# Patient Record
Sex: MALE | Employment: FULL TIME | URBAN - METROPOLITAN AREA
[De-identification: names, ages, dates, MRNs, and addresses within clinical notes are randomized per-mention and may not be internally consistent; named-entity substitution may affect disease eponyms.]

---

## 2021-03-03 DIAGNOSIS — Z23 NEED FOR VACCINATION: ICD-10-CM

## 2022-07-01 ENCOUNTER — IMAGING SERVICES (OUTPATIENT)
Dept: GENERAL RADIOLOGY | Age: 42
End: 2022-07-01

## 2022-07-01 ENCOUNTER — OFFICE VISIT (OUTPATIENT)
Dept: ORTHOPEDICS | Age: 42
End: 2022-07-01

## 2022-07-01 DIAGNOSIS — M67.911 TENDINOPATHY OF RIGHT ROTATOR CUFF: Primary | ICD-10-CM

## 2022-07-01 DIAGNOSIS — M25.511 RIGHT SHOULDER PAIN, UNSPECIFIED CHRONICITY: ICD-10-CM

## 2022-07-01 PROCEDURE — 99203 OFFICE O/P NEW LOW 30 MIN: CPT | Performed by: ORTHOPAEDIC SURGERY

## 2022-07-01 PROCEDURE — 73030 X-RAY EXAM OF SHOULDER: CPT | Performed by: ORTHOPAEDIC SURGERY

## 2022-10-02 ENCOUNTER — IMMUNIZATION (OUTPATIENT)
Dept: URGENT CARE | Age: 42
End: 2022-10-02

## 2022-10-02 DIAGNOSIS — Z23 NEED FOR VACCINATION: Primary | ICD-10-CM

## 2022-10-02 PROCEDURE — 90471 IMMUNIZATION ADMIN: CPT | Performed by: NURSE PRACTITIONER

## 2022-10-02 PROCEDURE — 90686 IIV4 VACC NO PRSV 0.5 ML IM: CPT | Performed by: NURSE PRACTITIONER

## 2023-11-17 PROBLEM — M54.30 SCIATICA: Status: ACTIVE | Noted: 2023-11-17

## 2023-11-17 PROBLEM — E78.5 HYPERLIPIDEMIA: Status: ACTIVE | Noted: 2023-11-17

## 2023-11-17 PROBLEM — J06.9 ACUTE URI: Status: ACTIVE | Noted: 2023-11-17

## 2023-11-17 PROBLEM — M75.100 NONTRAUMATIC TEAR OF ROTATOR CUFF: Status: ACTIVE | Noted: 2023-11-17

## 2024-08-06 PROBLEM — Z00.00 ROUTINE GENERAL MEDICAL EXAMINATION AT A HEALTH CARE FACILITY: Status: ACTIVE | Noted: 2024-08-06

## 2025-01-09 ENCOUNTER — APPOINTMENT (OUTPATIENT)
Dept: GENERAL RADIOLOGY | Facility: HOSPITAL | Age: 45
End: 2025-01-09
Attending: EMERGENCY MEDICINE
Payer: COMMERCIAL

## 2025-01-09 ENCOUNTER — APPOINTMENT (OUTPATIENT)
Dept: INTERVENTIONAL RADIOLOGY/VASCULAR | Facility: HOSPITAL | Age: 45
End: 2025-01-09
Attending: NURSE PRACTITIONER
Payer: COMMERCIAL

## 2025-01-09 ENCOUNTER — HOSPITAL ENCOUNTER (INPATIENT)
Facility: HOSPITAL | Age: 45
LOS: 8 days | Discharge: HOME HEALTH | End: 2025-01-17
Attending: EMERGENCY MEDICINE | Admitting: INTERNAL MEDICINE
Payer: COMMERCIAL

## 2025-01-09 ENCOUNTER — APPOINTMENT (OUTPATIENT)
Dept: CT IMAGING | Facility: HOSPITAL | Age: 45
End: 2025-01-09
Attending: EMERGENCY MEDICINE
Payer: COMMERCIAL

## 2025-01-09 ENCOUNTER — APPOINTMENT (OUTPATIENT)
Dept: CV DIAGNOSTICS | Facility: HOSPITAL | Age: 45
End: 2025-01-09
Attending: INTERNAL MEDICINE
Payer: COMMERCIAL

## 2025-01-09 DIAGNOSIS — I21.4 NSTEMI (NON-ST ELEVATED MYOCARDIAL INFARCTION) (HCC): Primary | ICD-10-CM

## 2025-01-09 DIAGNOSIS — J90 PLEURAL EFFUSION: ICD-10-CM

## 2025-01-09 DIAGNOSIS — G89.18 POST-OPERATIVE PAIN: ICD-10-CM

## 2025-01-09 LAB
ALBUMIN SERPL-MCNC: 4.4 G/DL (ref 3.2–4.8)
ALBUMIN/GLOB SERPL: 1.4 {RATIO} (ref 1–2)
ALP LIVER SERPL-CCNC: 55 U/L
ALT SERPL-CCNC: 23 U/L
ANION GAP SERPL CALC-SCNC: 10 MMOL/L (ref 0–18)
ANION GAP SERPL CALC-SCNC: 9 MMOL/L (ref 0–18)
APTT PPP: 24.7 SECONDS (ref 23–36)
APTT PPP: 49.3 SECONDS (ref 23–36)
AST SERPL-CCNC: 18 U/L (ref ?–34)
ATRIAL RATE: 49 BPM
ATRIAL RATE: 50 BPM
ATRIAL RATE: 52 BPM
ATRIAL RATE: 55 BPM
BASOPHILS # BLD AUTO: 0.09 X10(3) UL (ref 0–0.2)
BASOPHILS NFR BLD AUTO: 1.1 %
BILIRUB SERPL-MCNC: 0.8 MG/DL (ref 0.3–1.2)
BUN BLD-MCNC: 13 MG/DL (ref 9–23)
BUN BLD-MCNC: 14 MG/DL (ref 9–23)
CALCIUM BLD-MCNC: 10.2 MG/DL (ref 8.7–10.4)
CALCIUM BLD-MCNC: 10.5 MG/DL (ref 8.7–10.4)
CHLORIDE SERPL-SCNC: 105 MMOL/L (ref 98–112)
CHLORIDE SERPL-SCNC: 105 MMOL/L (ref 98–112)
CHOLEST SERPL-MCNC: 169 MG/DL (ref ?–200)
CO2 SERPL-SCNC: 24 MMOL/L (ref 21–32)
CO2 SERPL-SCNC: 28 MMOL/L (ref 21–32)
CREAT BLD-MCNC: 0.89 MG/DL
CREAT BLD-MCNC: 1 MG/DL
EGFRCR SERPLBLD CKD-EPI 2021: 107 ML/MIN/1.73M2 (ref 60–?)
EGFRCR SERPLBLD CKD-EPI 2021: 94 ML/MIN/1.73M2 (ref 60–?)
EOSINOPHIL # BLD AUTO: 0.22 X10(3) UL (ref 0–0.7)
EOSINOPHIL NFR BLD AUTO: 2.8 %
ERYTHROCYTE [DISTWIDTH] IN BLOOD BY AUTOMATED COUNT: 13.1 %
ERYTHROCYTE [DISTWIDTH] IN BLOOD BY AUTOMATED COUNT: 13.2 %
GLOBULIN PLAS-MCNC: 3.2 G/DL (ref 2–3.5)
GLUCOSE BLD-MCNC: 115 MG/DL (ref 70–99)
GLUCOSE BLD-MCNC: 121 MG/DL (ref 70–99)
HCT VFR BLD AUTO: 43.7 %
HCT VFR BLD AUTO: 46 %
HDLC SERPL-MCNC: 40 MG/DL (ref 40–59)
HGB BLD-MCNC: 14.8 G/DL
HGB BLD-MCNC: 15.4 G/DL
IMM GRANULOCYTES # BLD AUTO: 0.02 X10(3) UL (ref 0–1)
IMM GRANULOCYTES NFR BLD: 0.3 %
INR BLD: 1.11 (ref 0.8–1.2)
LDLC SERPL CALC-MCNC: 108 MG/DL (ref ?–100)
LYMPHOCYTES # BLD AUTO: 2.89 X10(3) UL (ref 1–4)
LYMPHOCYTES NFR BLD AUTO: 36.3 %
MCH RBC QN AUTO: 28 PG (ref 26–34)
MCH RBC QN AUTO: 28.3 PG (ref 26–34)
MCHC RBC AUTO-ENTMCNC: 33.5 G/DL (ref 31–37)
MCHC RBC AUTO-ENTMCNC: 33.9 G/DL (ref 31–37)
MCV RBC AUTO: 82.6 FL
MCV RBC AUTO: 84.4 FL
MONOCYTES # BLD AUTO: 0.84 X10(3) UL (ref 0.1–1)
MONOCYTES NFR BLD AUTO: 10.6 %
MRSA DNA SPEC QL NAA+PROBE: NEGATIVE
NEUTROPHILS # BLD AUTO: 3.9 X10 (3) UL (ref 1.5–7.7)
NEUTROPHILS # BLD AUTO: 3.9 X10(3) UL (ref 1.5–7.7)
NEUTROPHILS NFR BLD AUTO: 48.9 %
NONHDLC SERPL-MCNC: 129 MG/DL (ref ?–130)
OSMOLALITY SERPL CALC.SUM OF ELEC: 289 MOSM/KG (ref 275–295)
OSMOLALITY SERPL CALC.SUM OF ELEC: 295 MOSM/KG (ref 275–295)
P AXIS: -16 DEGREES
P AXIS: -16 DEGREES
P AXIS: -19 DEGREES
P AXIS: 2 DEGREES
P-R INTERVAL: 164 MS
P-R INTERVAL: 172 MS
P-R INTERVAL: 182 MS
P-R INTERVAL: 186 MS
PLATELET # BLD AUTO: 200 10(3)UL (ref 150–450)
PLATELET # BLD AUTO: 217 10(3)UL (ref 150–450)
POTASSIUM SERPL-SCNC: 3.8 MMOL/L (ref 3.5–5.1)
POTASSIUM SERPL-SCNC: 4.2 MMOL/L (ref 3.5–5.1)
PROT SERPL-MCNC: 7.6 G/DL (ref 5.7–8.2)
PROTHROMBIN TIME: 14.4 SECONDS (ref 11.6–14.8)
Q-T INTERVAL: 450 MS
Q-T INTERVAL: 454 MS
Q-T INTERVAL: 458 MS
Q-T INTERVAL: 458 MS
QRS DURATION: 160 MS
QRS DURATION: 162 MS
QRS DURATION: 164 MS
QRS DURATION: 166 MS
QTC CALCULATION (BEZET): 410 MS
QTC CALCULATION (BEZET): 413 MS
QTC CALCULATION (BEZET): 422 MS
QTC CALCULATION (BEZET): 438 MS
R AXIS: 85 DEGREES
R AXIS: 90 DEGREES
R AXIS: 94 DEGREES
R AXIS: 96 DEGREES
RBC # BLD AUTO: 5.29 X10(6)UL
RBC # BLD AUTO: 5.45 X10(6)UL
SARS-COV-2 RNA RESP QL NAA+PROBE: NOT DETECTED
SODIUM SERPL-SCNC: 139 MMOL/L (ref 136–145)
SODIUM SERPL-SCNC: 142 MMOL/L (ref 136–145)
T AXIS: -4 DEGREES
T AXIS: 0 DEGREES
T AXIS: 25 DEGREES
T AXIS: 26 DEGREES
TRIGL SERPL-MCNC: 118 MG/DL (ref 30–149)
TROPONIN I SERPL HS-MCNC: 2217 NG/L
TROPONIN I SERPL HS-MCNC: 254 NG/L
TROPONIN I SERPL HS-MCNC: 83 NG/L
VENTRICULAR RATE: 49 BPM
VENTRICULAR RATE: 50 BPM
VENTRICULAR RATE: 52 BPM
VENTRICULAR RATE: 55 BPM
VLDLC SERPL CALC-MCNC: 20 MG/DL (ref 0–30)
WBC # BLD AUTO: 8 X10(3) UL (ref 4–11)
WBC # BLD AUTO: 9.6 X10(3) UL (ref 4–11)

## 2025-01-09 PROCEDURE — 93306 TTE W/DOPPLER COMPLETE: CPT | Performed by: INTERNAL MEDICINE

## 2025-01-09 PROCEDURE — B211YZZ FLUOROSCOPY OF MULTIPLE CORONARY ARTERIES USING OTHER CONTRAST: ICD-10-PCS | Performed by: INTERNAL MEDICINE

## 2025-01-09 PROCEDURE — 99223 1ST HOSP IP/OBS HIGH 75: CPT | Performed by: STUDENT IN AN ORGANIZED HEALTH CARE EDUCATION/TRAINING PROGRAM

## 2025-01-09 PROCEDURE — B215YZZ FLUOROSCOPY OF LEFT HEART USING OTHER CONTRAST: ICD-10-PCS | Performed by: INTERNAL MEDICINE

## 2025-01-09 PROCEDURE — 71045 X-RAY EXAM CHEST 1 VIEW: CPT | Performed by: EMERGENCY MEDICINE

## 2025-01-09 PROCEDURE — 71260 CT THORAX DX C+: CPT | Performed by: EMERGENCY MEDICINE

## 2025-01-09 PROCEDURE — 4A023N7 MEASUREMENT OF CARDIAC SAMPLING AND PRESSURE, LEFT HEART, PERCUTANEOUS APPROACH: ICD-10-PCS | Performed by: INTERNAL MEDICINE

## 2025-01-09 RX ORDER — PROCHLORPERAZINE EDISYLATE 5 MG/ML
5 INJECTION INTRAMUSCULAR; INTRAVENOUS EVERY 8 HOURS PRN
Status: DISCONTINUED | OUTPATIENT
Start: 2025-01-09 | End: 2025-01-13

## 2025-01-09 RX ORDER — HEPARIN SODIUM AND DEXTROSE 10000; 5 [USP'U]/100ML; G/100ML
INJECTION INTRAVENOUS CONTINUOUS
Status: DISCONTINUED | OUTPATIENT
Start: 2025-01-09 | End: 2025-01-13

## 2025-01-09 RX ORDER — NITROGLYCERIN 20 MG/100ML
INJECTION INTRAVENOUS
Status: COMPLETED
Start: 2025-01-09 | End: 2025-01-09

## 2025-01-09 RX ORDER — ACETAMINOPHEN 500 MG
500 TABLET ORAL EVERY 4 HOURS PRN
Status: DISCONTINUED | OUTPATIENT
Start: 2025-01-09 | End: 2025-01-13

## 2025-01-09 RX ORDER — POLYETHYLENE GLYCOL 3350 17 G/17G
17 POWDER, FOR SOLUTION ORAL DAILY PRN
Status: DISCONTINUED | OUTPATIENT
Start: 2025-01-09 | End: 2025-01-13

## 2025-01-09 RX ORDER — AMLODIPINE BESYLATE 5 MG/1
5 TABLET ORAL DAILY
COMMUNITY
Start: 2024-11-27 | End: 2025-01-17

## 2025-01-09 RX ORDER — NITROGLYCERIN 20 MG/100ML
INJECTION INTRAVENOUS CONTINUOUS
Status: DISCONTINUED | OUTPATIENT
Start: 2025-01-09 | End: 2025-01-13

## 2025-01-09 RX ORDER — HEPARIN SODIUM 5000 [USP'U]/ML
INJECTION, SOLUTION INTRAVENOUS; SUBCUTANEOUS
Status: COMPLETED
Start: 2025-01-09 | End: 2025-01-09

## 2025-01-09 RX ORDER — SENNOSIDES 8.6 MG
17.2 TABLET ORAL NIGHTLY PRN
Status: DISCONTINUED | OUTPATIENT
Start: 2025-01-09 | End: 2025-01-13

## 2025-01-09 RX ORDER — IOPAMIDOL 755 MG/ML
100 INJECTION, SOLUTION INTRAVASCULAR
Status: COMPLETED | OUTPATIENT
Start: 2025-01-09 | End: 2025-01-09

## 2025-01-09 RX ORDER — ATORVASTATIN CALCIUM 10 MG/1
10 TABLET, FILM COATED ORAL DAILY
COMMUNITY
Start: 2024-11-27 | End: 2025-01-17

## 2025-01-09 RX ORDER — MULTIVIT-MIN/IRON FUM/FOLIC AC 7.5 MG-4
1 TABLET ORAL DAILY
COMMUNITY

## 2025-01-09 RX ORDER — SODIUM CHLORIDE 9 MG/ML
INJECTION, SOLUTION INTRAVENOUS
Status: DISCONTINUED | OUTPATIENT
Start: 2025-01-10 | End: 2025-01-09 | Stop reason: HOSPADM

## 2025-01-09 RX ORDER — ASPIRIN 81 MG/1
81 TABLET, CHEWABLE ORAL DAILY
Status: DISCONTINUED | OUTPATIENT
Start: 2025-01-09 | End: 2025-01-13

## 2025-01-09 RX ORDER — ASPIRIN 81 MG/1
TABLET, CHEWABLE ORAL
Status: COMPLETED
Start: 2025-01-09 | End: 2025-01-09

## 2025-01-09 RX ORDER — MULTIVIT WITH MINERALS/LUTEIN
1000 TABLET ORAL DAILY
COMMUNITY

## 2025-01-09 RX ORDER — MIDAZOLAM HYDROCHLORIDE 1 MG/ML
INJECTION INTRAMUSCULAR; INTRAVENOUS
Status: COMPLETED
Start: 2025-01-09 | End: 2025-01-09

## 2025-01-09 RX ORDER — ECHINACEA PURPUREA EXTRACT 125 MG
1 TABLET ORAL
Status: DISCONTINUED | OUTPATIENT
Start: 2025-01-09 | End: 2025-01-17

## 2025-01-09 RX ORDER — ROSUVASTATIN CALCIUM 20 MG/1
20 TABLET, COATED ORAL NIGHTLY
Status: DISCONTINUED | OUTPATIENT
Start: 2025-01-09 | End: 2025-01-17

## 2025-01-09 RX ORDER — ONDANSETRON 2 MG/ML
4 INJECTION INTRAMUSCULAR; INTRAVENOUS EVERY 6 HOURS PRN
Status: DISCONTINUED | OUTPATIENT
Start: 2025-01-09 | End: 2025-01-13

## 2025-01-09 RX ORDER — SODIUM PHOSPHATE, DIBASIC AND SODIUM PHOSPHATE, MONOBASIC 7; 19 G/230ML; G/230ML
1 ENEMA RECTAL ONCE AS NEEDED
Status: DISCONTINUED | OUTPATIENT
Start: 2025-01-09 | End: 2025-01-13

## 2025-01-09 RX ORDER — BISACODYL 10 MG
10 SUPPOSITORY, RECTAL RECTAL
Status: DISCONTINUED | OUTPATIENT
Start: 2025-01-09 | End: 2025-01-13

## 2025-01-09 RX ORDER — HEPARIN SODIUM AND DEXTROSE 10000; 5 [USP'U]/100ML; G/100ML
12 INJECTION INTRAVENOUS ONCE
Status: COMPLETED | OUTPATIENT
Start: 2025-01-09 | End: 2025-01-09

## 2025-01-09 RX ORDER — SODIUM CHLORIDE 9 MG/ML
INJECTION, SOLUTION INTRAVENOUS CONTINUOUS
Status: DISCONTINUED | OUTPATIENT
Start: 2025-01-09 | End: 2025-01-13

## 2025-01-09 RX ORDER — VERAPAMIL HYDROCHLORIDE 2.5 MG/ML
INJECTION, SOLUTION INTRAVENOUS
Status: COMPLETED
Start: 2025-01-09 | End: 2025-01-09

## 2025-01-09 RX ORDER — BENZONATATE 100 MG/1
200 CAPSULE ORAL 3 TIMES DAILY PRN
Status: DISCONTINUED | OUTPATIENT
Start: 2025-01-09 | End: 2025-01-13

## 2025-01-09 RX ORDER — HEPARIN SODIUM 1000 [USP'U]/ML
5000 INJECTION, SOLUTION INTRAVENOUS; SUBCUTANEOUS ONCE
Status: COMPLETED | OUTPATIENT
Start: 2025-01-09 | End: 2025-01-09

## 2025-01-09 RX ORDER — LIDOCAINE HYDROCHLORIDE 10 MG/ML
INJECTION, SOLUTION EPIDURAL; INFILTRATION; INTRACAUDAL; PERINEURAL
Status: COMPLETED
Start: 2025-01-09 | End: 2025-01-09

## 2025-01-09 NOTE — ED PROVIDER NOTES
Patient Seen in: [facility] Emergency Department      History     Chief Complaint   Patient presents with    Chest Pain Angina     Stated Complaint:     Subjective:   HPI      This is a 44-year-old gentleman history of hypertension hyperlipidemia here for evaluation of chest discomfort.  States last night around 5 PM was in the garage tossing a basketball with his 10-year-old daughter, began to develop central chest pressure, states it felt like when his chest is exposed to cold air, just felt tight.  Symptoms persisted, seemed to improve a few hours later took some Tums thinking it might help in case it was reflux.  Then woke up with more significant chest discomfort more of a pressure, felt sweaty.  Symptoms persisted, decided to call EMS as they were not getting better.  Symptoms are constant nothing makes it better or worse.  Reports father with heart attack in his 40s.  Patient is otherwise had no recent change in his exercise tolerance no recent fevers vomiting difficulty breathing leg swelling any other complaints.  Did receive aspirin as well as sublingual nitroglycerin en route states that sublingual nitroglycerin did help with his symptoms.    Objective:     Past Medical History:    Essential hypertension    High blood pressure    High cholesterol    Hyperlipidemia              History reviewed. No pertinent surgical history.             Social History     Socioeconomic History    Marital status:    Tobacco Use    Smoking status: Never    Smokeless tobacco: Never   Substance and Sexual Activity    Alcohol use: Not Currently     Comment: socially    Drug use: Never     Social Drivers of Health     Financial Resource Strain: Low Risk  (7/31/2024)    Received from [facility] in [place]    Financial Resource Strain     In the past year, have you or any family members you live with been unable to get any of the following when it was really needed? Check all that apply.: None   Food Insecurity:  Low Risk  (7/31/2024)    Received from [facility]    Food Insecurity     Within the past 12 months, you worried that your food would run out before you got money to buy more.  : Never true     Within the past 12 months, the food you bought just didn't last and you didn't have money to get more. : Never true   Transportation Needs: Not At Risk (7/31/2024)    Received from [facility]    Transportation Needs     In the past 12 months, has lack of reliable transportation kept you from medical appointments, meetings, work or from getting things needed for daily living? : No   Physical Activity: Unknown (7/31/2024)    Received from [facility]    Exercise Vital Sign     On average, how many minutes do you engage in exercise at this level?: 40 min   Stress: Low Risk  (7/31/2024)    Received from [facility]    Stress     Stress is when someone feels tense, nervous, anxious, or can't sleep at night because their mind is troubled. How stressed are you? : Not at all   Social Connections: Low Risk  (11/17/2023)    Received from [facility]    Social Connections     How often do you see or talk to people that you care about and feel close to? (For example: talking to friends on the phone, visiting friends or family, going to S[employer] or club meetings): 5 or more times a week                  Physical Exam     ED Triage Vitals   BP 01/09/25 0506 (!) 185/101   Pulse 01/09/25 0506 51   Resp 01/09/25 0506 14   Temp 01/09/25 0515 98.4 °F (36.9 °C)   Temp src 01/09/25 0515 Oral   SpO2 01/09/25 0506 100 %   O2 Device 01/09/25 0506 None (Room air)       Current Vitals:   Vital Signs  BP: 143/88  Pulse: 67  Resp: 15  Temp: 97.3 °F (36.3 °C)  Temp src: Temporal  MAP (mmHg): (!) 102    Oxygen Therapy  SpO2: 98 %  O2 Device: None (Room air)        Physical Exam      Physical Exam  Vitals signs and nursing note reviewed.   General:  Patient laying supine in the bed in no acute  distress  Head: Normocephalic and atraumatic.   HEENT:  Mucous membranes are moist.   Cardiovascular:  Normal rate and regular rhythm.  No Edema, bilateral radial pulses 2+.  Pulmonary:  Pulmonary effort is normal.  Normal breath sounds. No wheezing, rhonchi or rales.   Abdominal: Soft nontender nondistended, normal bowel sounds, no guarding no rebound tenderness  Skin: Warm and dry  Neurological: Awake alert, speech is normal        ED Course     Labs Reviewed   COMP METABOLIC PANEL (14) - Abnormal; Notable for the following components:       Result Value    Glucose 115 (*)     Calcium, Total 10.5 (*)     All other components within normal limits   TROPONIN I HIGH SENSITIVITY - Abnormal; Notable for the following components:    Troponin I (High Sensitivity) 83 (*)     All other components within normal limits   LIPID PANEL - Abnormal; Notable for the following components:    LDL Cholesterol 108 (*)     All other components within normal limits   TROPONIN I HIGH SENSITIVITY - Abnormal; Notable for the following components:    Troponin I (High Sensitivity) 254 (*)     All other components within normal limits   TROPONIN I HIGH SENSITIVITY - Abnormal; Notable for the following components:    Troponin I (High Sensitivity) 2,217 (*)     All other components within normal limits   BASIC METABOLIC PANEL (8) - Abnormal; Notable for the following components:    Glucose 121 (*)     All other components within normal limits   PROTHROMBIN TIME (PT) - Normal   PTT, ACTIVATED - Normal   RAPID SARS-COV-2 BY PCR - Normal   ED/MRSA SCREEN BY PCR-CC - Normal   CBC WITH DIFFERENTIAL WITH PLATELET   CBC, PLATELET; NO DIFFERENTIAL   PTT, ACTIVATED   RAINBOW DRAW LAVENDER   RAINBOW DRAW LIGHT GREEN   RAINBOW DRAW BLUE   RAINBOW DRAW GOLD     EKG  Rate, intervals and axes as noted on EKG Report.  Rate: 55  Rhythm: Sinus Rhythm  Reading: Isolated T wave inversions in V3, no STEMI      EKG    Rate, intervals and axes as noted on EKG  Report.  Rate: 52  Rhythm: Sinus Rhythm  Reading: Isolated T wave inversion in V3, no STEMI no change from prior    EKG  Rate, intervals and axes as noted on EKG Report.  Rate: 49  Rhythm: Sinus Rhythm  Reading: Isolated T wave inversion in V3 no STEMI, some T wave inversion developing in lead III    EKG    Rate, intervals and axes as noted on EKG Report.  Rate: 50  Rhythm: Sinus Rhythm  Reading: T wave inversion involving now in V3 V4 half box ST elevation in V2, no STEMI                CATH ANGIO    Result Date: 1/9/2025  This exam has been completed. Please refer to Notes for the results to this procedure.    XR CHEST AP PORTABLE  (CPT=71045)    Result Date: 1/9/2025  PROCEDURE:  XR CHEST AP PORTABLE  (CPT=71045)  TECHNIQUE:  AP chest radiograph was obtained.  COMPARISON:  None.  INDICATIONS:  cp  PATIENT STATED HISTORY: (As transcribed by Technologist)  Pt. with chest pain.    FINDINGS:  No lobar consolidation.  Cardiac silhouette is within normal limits for AP technique.  No significant pleural fluid or pneumothorax.            CONCLUSION:  No acute cardiopulmonary process.  A preliminary report was provided by the [facility] teleradiology service.    LOCATION:  [facility]      Dictated by (CST): Stromberg, LeRoy, MD on 1/09/2025 at 8:28 AM     Finalized by (CST): Stromberg, LeRoy, MD on 1/09/2025 at 8:29 AM       CT CHEST PE AORTA (IV ONLY) (CPT=71260)    Result Date: 1/9/2025  PROCEDURE:  CT CHEST PE AORTA (IV ONLY) (CPT=71260)  COMPARISON:  None.  INDICATIONS:  chest pain  TECHNIQUE:  CT images were obtained with non-ionic intravenous contrast material. Dose reduction techniques were used. Dose information is transmitted to the ACR (American College of Radiology) NRDR (National Radiology Data Registry) which includes the Dose Index Registry.  PATIENT STATED HISTORY:(As transcribed by Technologist)  Chest pain   CONTRAST USED:  100cc of Isovue 370  FINDINGS:  LUNGS:  No visible pulmonary disease.  VASCULATURE:  No  visible pulmonary arterial thrombus or attenuation.  NIYA:  No mass or adenopathy.  MEDIASTINUM:  No mass or adenopathy.  Mild concentric thickening of the distal esophagus. CARDIAC:  No enlargement, pericardial thickening, or significant coronary artery calcification. PLEURA:  No mass or effusion.  THORACIC AORTA:  No thoracic aortic aneurysm or dissection.  Note made of moderate vessel narrowing of the proximal left subclavian artery as it passes between the clavicle and left 1st rib (series 4, image 42). CHEST WALL:  No mass or axillary adenopathy.  Prominent network of vascular collaterals noted about the right shoulder girdle.  Note made of marked narrowing of the right subclavian vein as it passes between the clavicle and 1st rib (series 4, image 49).   LIMITED ABDOMEN:  Limited images of the upper abdomen are unremarkable.  BONES:  No bony lesion or fracture.             CONCLUSION:   1. Negative for pulmonary embolism or other acute cardiopulmonary process.  2. Mild concentric thickening of the mid/distal esophagus may reflect esophagitis, best assessed with endoscopy.  3. Prominent network of vascular collaterals noted about the right shoulder girdle.  This is secondary to marked narrowing of the right subclavian vein as it passes between the clavicle and 1st rib, imaging features suggesting a component of thoracic outlet syndrome of venous origin.  In addition, there is moderate narrowing of the proximal left subclavian artery as it passes between the left clavicle and 1st rib which may contribute to thoracic outlet syndrome of arterial origin.    LOCATION:  [facility]   Dictated by (CST): Jad Mendez MD on 1/09/2025 at 8:02 AM     Finalized by (CST): Jad Mendez MD on 1/09/2025 at 8:07 AM             MDM      This is a 44-year-old gentleman here for evaluation of chest pain.  Differential includes ACS, pneumothorax, aortic dissection, GERD, hypertensive emergency.  Initial EKG with isolated T wave inversion  in V3.  Initial troponin elevated above normal at 83.  Patient reports improvement in his chest discomfort from about a 5 on arrival to 2 on nitroglycerin drip.  Heparin drip started.  Case discussed with [facility] patient will be admitted to the CCU blood pressure improved to 130 systolic on nitroglycerin drip.    Case endorsed to [place] hospitalist Dr. Quevedo who agrees with plan for admission    0650 patient reports 2 out of 10 chest discomfort does have some evolving EKG changes with T wave inversions in multiple leads half box of ST elevation in V2.  Discussed these changes with cardiology KIKI Larson, repeat troponin requested.  CT of the chest requested shows no acute abnormalities      I independently viewed and interpreted the following imaging: Chest x-ray does not show widened mediastinum  Admission disposition: 1/9/2025  5:44 AM           Total critical care time: Approximately 70 minutes  Due to a high probability of clinically significant, life threatening deterioration, the patient required my highest level of preparedness to intervene emergently and I personally spent this critical care time directly and personally managing the patient. This critical care time included obtaining a history; examining the patient; pulse oximetry; ordering and review of studies; arranging urgent treatment with development of a management plan; evaluation of patient's response to treatment; frequent reassessment; and, discussions with other providers.  This critical care time was performed to assess and manage the high probability of imminent, life-threatening deterioration that could result in multi-organ failure. It was exclusive of separately billable procedures and treating other patients and teaching time.      Medical Decision Making      Disposition and Plan     Clinical Impression:  1. NSTEMI (non-ST elevated myocardial infarction) ([facility])         Disposition:  Admit  1/9/2025  5:44 am    Follow-up:  No follow-up  provider specified.        Medications Prescribed:  Current Discharge Medication List              Supplementary Documentation:         Hospital Problems       Present on Admission             ICD-10-CM Noted POA    * (Principal) NSTEMI (non-ST elevated myocardial infarction) (HCC) I21.4 1/9/2025 Unknown

## 2025-01-09 NOTE — H&P
[facility]     Wander Castro Patient Status:  Emergency    1980 MRN [id number]   [facility] EMERGENCY DEPARTMENT Attending Nikolay Garcia MD   Hosp Day # 0 PCP No primary care provider on file.     Chief Complaint: chest pain    Subjective:    History of Present Illness:     Wander Castro is a 44 year old male with PMHx HTN/HLD who presented to the hospital for chest pain. His pain started yesterday when he as helping train his daughter in the garage for basketball. It was a pressure sensation in his substernal region rated 2-6/10 which was decreased with nitroglycerin and had no exacerbating factors. He notes feeling winded and lightheaded and went and sat down before it resolved. He then woke up at 4 AM with the pain again and decided to seek medical eval. He denied any prior stress test but notes a family history of an MI in his father in his 40's.    History/Other:    Past Medical History:  Past Medical History:    Essential hypertension    Hyperlipidemia     Past Surgical History:   History reviewed. No pertinent surgical history.   Family History:   No family history on file.  Social History:    reports that he has never smoked. He has never used smokeless tobacco. He reports that he does not currently use alcohol. He reports that he does not use drugs.     Allergies: Allergies[1]    Medications:  Medications Ordered Prior to Encounter[2]    Review of Systems:   A comprehensive review of systems was completed.    Pertinent positives and negatives noted in the HPI.    Objective:   Physical Exam:    /85   Pulse (!) 48   Temp 98.4 °F (36.9 °C) (Oral)   Resp 10   Ht 5' 10\" (1.778 m)   Wt 190 lb (86.2 kg)   SpO2 100%   BMI 27.26 kg/m²   General: No acute distress, Alert  Respiratory: No rhonchi, no wheezes  Cardiovascular: S1, S2. Regular rate and rhythm  Abdomen: Soft, Non-tender, non-distended, positive bowel sounds  Neuro: No new focal  deficits  Extremities: No edema    Results:    Labs:      Labs Last 24 Hours:    Recent Labs   Lab 01/09/25  0509   RBC 5.45   HGB 15.4   HCT 46.0   MCV 84.4   MCH 28.3   MCHC 33.5   RDW 13.2   NEPRELIM 3.90   WBC 8.0   .0       Recent Labs   Lab 01/09/25  0509   *   BUN 14   CREATSERUM 1.00   EGFRCR 94   CA 10.5*   ALB 4.4      K 3.8      CO2 28.0   ALKPHO 55   AST 18   ALT 23   BILT 0.8   TP 7.6       Estimated Glomerular Filtration Rate: 94 mL/min/1.73m2 (by CKD-EPI based on SCr of 1 mg/dL).    Lab Results   Component Value Date    INR 1.11 01/09/2025       Recent Labs   Lab 01/09/25  0509   TROPHS 83*       No results for input(s): \"TROP\", \"PBNP\" in the last 168 hours.    No results for input(s): \"PCT\" in the last 168 hours.    Imaging: Imaging data reviewed in Epic.    Assessment & Plan:      #NSTEMI  -troponin 83  -lipid panel significant for   -EKG showing sinus bradycardia @49 bpm, RBBB  -chest xray without acute process  -cont to trend troponin  -ASA  -pain controled on nitroglycerin gtt with close monitoring of blood pressure  -monitor on telemetry  -on heparin gtt with close monitoring of ptt and titration prn  -cardiology c/s in ED    #HTN  -cont home meds    #HLD  -cont home statin        Plan of care discussed with ED physician    Amy Quevedo DO    Supplementary Documentation:     The 21st Century Cures Act makes medical notes like these available to patients in the interest of transparency. Please be advised this is a medical document. Medical documents are intended to carry relevant information, facts as evident, and the clinical opinion of the practitioner. The medical note is intended as peer to peer communication and may appear blunt or direct. It is written in medical language and may contain abbreviations or verbiage that are unfamiliar.                                       [1] No Known Allergies  [2]   No current facility-administered medications on file  prior to encounter.     Current Outpatient Medications on File Prior to Encounter   Medication Sig Dispense Refill    ATORVASTATIN CALCIUM OR Take by mouth.      UNKNOWN TO PATIENT - BLOOD PRESSURE

## 2025-01-09 NOTE — PLAN OF CARE
Patient seen and examined by Dr. Ronal Cooper, full consult to follow. CTA noted left subclavian artery stenosis. BP right arm- 128/88. BP left arm- 143/88.     Kary Barros PA-C   Cardiothoracic Surgery   1/9/2025  1:39 PM

## 2025-01-09 NOTE — CONSULTS
[facility]  [facility]  Report of Consultation    Wander Castro Patient Status:  Emergency    1980 MRN [id number]   Location [facility] EMERGENCY DEPARTMENT Attending Nikolay Garcia MD   Hosp Day # 0 PCP No primary care provider on file.     IP Consult to Cardiology  Consult performed by: Kary Larson APRN  Consult ordered by: Nikolay Garcia MD          Reason for Consultation:  AMI    History of Present Illness:  Wander Castro is a a(n) 44 year old male with HTN, dyslipidemia presents with chest pain.  Typically execises without issues. Last night while playing light basketball had a \"cold feeling\" in his chest, with associated fatigue.  Dissipated after a nap. This am woke up with crushing 10/10 central chest pain at 0400. Associated diaphoresis and dizziness. Called EMS.  Pain persistent but now somewhat improved on nitroglycerin gtt, 2/10. Denies syncope. History of vasovagal syncope with sight of blood years ago.     History:  Past Medical History:    Essential hypertension    Hyperlipidemia     History reviewed. No pertinent surgical history.  No family history on file.   reports that he has never smoked. He has never used smokeless tobacco. He reports that he does not currently use alcohol. He reports that he does not use drugs.    Allergies:  Allergies[1]    Medications:    Current Facility-Administered Medications:     nitroGLYCERIN in dextrose 5% 50 mg/250mL infusion premix, 5-400 mcg/min, Intravenous, Continuous    heparin (Porcine) 63462 units/250mL infusion ED INITIAL DOSE, 12 Units/kg/hr, Intravenous, Once    heparin (Porcine) 22273 units/250mL infusion ACS/AFIB CONTINUOUS, 200-3,000 Units/hr, Intravenous, Continuous    Review of Systems:    Respiratory:  Positive for chest tightness. Negative for shortness of breath.    Cardiovascular:  Positive for chest pain. Negative for palpitations and leg swelling.   Genitourinary:  Negative for flank pain.    Musculoskeletal:  Negative for back pain.   Neurological:  Positive for dizziness and light-headedness. Negative for syncope.   All other systems reviewed and are negative.        OBJECTIVE  Blood pressure 141/84, pulse 52, temperature 98.4 °F (36.9 °C), temperature source Oral, resp. rate 13, height 5' 10\" (1.778 m), weight 190 lb (86.2 kg), SpO2 100%.  Temp (24hrs), Av.4 °F (36.9 °C), Min:98.4 °F (36.9 °C), Max:98.4 °F (36.9 °C)    Wt Readings from Last 3 Encounters:   25 190 lb (86.2 kg)       Telemetry: NSR., SB with RBBB      Physical Exam:  Physical Exam  Neck:      Comments: No jvd   Cardiovascular:      Rate and Rhythm: Normal rate and regular rhythm.   Pulmonary:      Effort: Pulmonary effort is normal.      Breath sounds: No rales.   Abdominal:      Palpations: Abdomen is soft.   Musculoskeletal:      Right lower leg: No edema.      Left lower leg: No edema.   Skin:     General: Skin is warm and dry.   Neurological:      Mental Status: He is alert and oriented to person, place, and time.              Diagnostics History:            Results:   Recent Labs   Lab 25  0509   *   BUN 14   CREATSERUM 1.00   EGFRCR 94   CA 10.5*      K 3.8      CO2 28.0     Recent Labs   Lab 25  0509   RBC 5.45   HGB 15.4   HCT 46.0   MCV 84.4   MCH 28.3   MCHC 33.5   RDW 13.2   NEPRELIM 3.90   WBC 8.0   .0         [unfilled]  No results for input(s): \"BNP\" in the last 168 hours.  Lab Results   Component Value Date    INR 1.11 2025     No results found for: \"TROP\"      No results found.       Assessment   Chest pain /NSTEMI- heparin,nitroglycerin gtt   RBBB  Sinus bradycardia   HTN  HL      Plan:   -rule out aortic dissection , if negative will proceed with angiogram.     Risks, benefits, and alternatives of cardiac cath/PCI discussed in detail.  Risks include but not limited to death, MI, CVA, emergency CABG, transfusion, and surgical repair of vascular complications.  Patient  agreeable to procced.       Kary Larson, APRN  1/9/2025  7:24 AM      I have personally seen and examined patient.   I have independently formulated assessment and plan  I agree with the AP note    Patient presented with chest pain.  Yesterday, he had off-and-on chest pain.  Was noticed chest pain when he was salting his driveway.  Complained to his wife.  States pain resolved.  However continued to have stuttering pain all day yesterday.  Wife recommended going to the emergency room yesterday but patient declined.  This morning he woke up with severe sharp chest pain.  Associated diaphoresis and clamminess.  Called his wife and came to the emergency room.  Pain was nonpositional.  He was started on nitro drip.  This improved his pain.  Currently pain is resolved.  He is on a nitro drip.  Family history: Significant for MI in father in late 40s  EKG shows sinus rhythm with right bundle branch block and nonspecific changes.  Initial troponin was 80.  Second troponin was rising to 250.  Physical exam:  Alert and oriented x 3, no murmurs  Regular rate rhythm, no edema    Assessment and plan:  Non-STEMI type I  Strong family history of coronary artery disease      Patient presents with chest pain.  Chest pain currently relieved on nitro drip.  Presentation concerning for non-STEMI.  Will plan for cardiac catheterization.  Indication, risk, benefit and alternative were discussed in detail with patient and his wife.  They are in agreement.  Further recommendation after cardiac catheterization.    Patient was consented for left heart cardiac catheterization. The indications,risks, benefits and alternatives of the procedure were explained to the patient. The  risk of coronary angiography, possible angioplasty, include, but are not limited to stroke, death, heart attack, coronary dissection requiring emergent CABG, hematoma, bleeding, allergic reaction to medications, vascular damage requiring surgical repair,  kidney failure requiring dialysis and others. Patient wishes to proceed.    Thank you for the opportunity to participate in the care of your patient.     Sanchez Juarez MD  Interventional Cardiologist  [facility]           [1] No Known Allergies

## 2025-01-09 NOTE — PROCEDURES
[facility]   part of [facility]    Cardiac Cath Procedure Note  Wander Castro Patient Status:  Inpatient    1980 MRN [id number]   Location [facility] 6NE-A Attending Leslie Hernandes,    Hosp Day # 0 PCP No primary care provider on file.       Cardiologist: Sanchez Juarez MD  Primary Proceduralist: Sanchez Juarez MD  Procedure Performed: [facility]  Date of Procedure: 2025   Indication: NSTEMI    Consent:   Informed written consent was obtained from the patient after risk benefits and alternative explained the patient.  Patient agreed to proceed    Sedation  IV conscious sedation was achieved with Versed and fentanyl.  It was administered under my direct supervision.  Hemodynamic monitoring took place throughout the entire procedure by myself in the Cath Lab staff.  2 mg of Versed and 50mcg of Fentanyl were given.       Description of the procedure: After written informed consent was obtained from the patient, patient was brought to the cardiac catheterization laboratory in a stable condition and fasting state.  Patient was prepped and draped in the usual sterile fashion.  IV conscious sedation was achieved with Versed and fentanyl.  Lidocaine 1% was used to infiltrate the right radial artery for local anesthesia and a 6 Welsh introducer sheath was inserted into the right radial artery.      Specimen sent to: No specimen collected  Estimated blood loss: 10 cc  Closure:  TR band    Left Ventriculography and hemodynamics:   LV EDP 12 mmHg  No gradient across aortic valve        Coronary Angiography  RCA:  Large sized. Dominant. Mild CAD.   Left main:  Large, no significant obstructive CAD  Left anterior descending:  Large sized vessel. Has a 70% mid LAD stenosis. Ostium of diagonal appears to have a focal 70% stenosis.   Circumflex:  Anamolous arising from the ostium of RCA. There is a 99% stenosis pf the second marginal. This is culprit for patient presentation of NSTEMI. JAY 3 flow.        Assessment:    Multivessel CAD involving 70% mid LAD and 99% stenosis of OM2 with anamolous circumflex arising from the RCA       Recommendations:    Recommend cardiothoracic surgery evaluation for possible CABG  Routine post cath care  Resume heparin gtt  Echo  Discussed with patient          Sanchez Juarez MD  01/09/25

## 2025-01-09 NOTE — PAYOR COMM NOTE
--------------  ADMISSION REVIEW     Payor: [facility] (NON CONTRACTED IPA)  Subscriber #:  [id number]  Authorization Number: [id number]    Admit date: 1/9/25  Admit time:  8:05 AM       REVIEW DOCUMENTATION:  ED Provider Notes    No notes of this type exist for this encounter.         MEDICATIONS ADMINISTERED IN LAST 1 DAY:  heparin (Porcine) 1000 UNIT/ML injection - BOLUS IV 5,000 Units       Date Action Dose Route User    1/9/2025 0604 Given 5,000 Units Intravenous Tayler Vargas RN          heparin (Porcine) 70741 units/250mL infusion ACS/AFIB CONTINUOUS       Date Action Dose Route User    1/9/2025 1105 New Bag 1,000 Units/hr Intravenous Saliu, Ardita          heparin (Porcine) 25284 units/250mL infusion ED INITIAL DOSE       Date Action Dose Route User    1/9/2025 0606 New Bag 12 Units/kg/hr × 86.2 kg Intravenous Tayler Vargas RN          iopamidol (ISOVUE-370) 76 % injection 100 mL       Date Action Dose Route User    1/9/2025 1052 Given 150 mL Injection Shakeel Carlisle          iopamidol 76% (ISOVUE-370) injection for power injector       Date Action Dose Route User    1/9/2025 0740 Given 100 mL Intravenous Spies, Ana Cristina          nitroGLYCERIN in dextrose 5% 50 mg/250mL infusion premix       Date Action Dose Route User    1/9/2025 0655 Rate/Dose Change 50 mcg/min Intravenous Tayler Vargas RN    1/9/2025 0650 Rate/Dose Change 35 mcg/min Intravenous Tayler Vargas RN    1/9/2025 0615 Rate/Dose Change 25 mcg/min Intravenous Tayler Vargas RN    1/9/2025 0600 Rate/Dose Change 20 mcg/min Intravenous Tayler Vargas RN    1/9/2025 0555 Rate/Dose Change 15 mcg/min Intravenous Tayler Vargas RN    1/9/2025 0550 Rate/Dose Change 10 mcg/min Intravenous Tayler Vargas RN    1/9/2025 0530 New Bag 5 mcg/min Intravenous Tayler Vargas RN          sodium chloride 0.9% infusion       Date Action Dose Route User    1/9/2025 7199  New Bag (none) Intravenous Saliu, Ardita            Vitals (last day)       Date/Time Temp Pulse Resp BP SpO2 Weight O2 Device O2 Flow Rate (L/min) [facility]    01/09/25 1130 -- 65 11 129/84 95 % -- -- -- AS    01/09/25 1115 -- -- -- 125/81 95 % -- None (Room air) -- AS    01/09/25 0900 -- 65 9 137/81 95 % -- -- -- AS    01/09/25 0800 97.9 °F (36.6 °C) 56 15 123/77 100 % -- None (Room air) -- AS    01/09/25 0740 -- 53 15 135/81 100 % -- None (Room air) -- AL    01/09/25 0735 -- 60 12 119/74 100 % -- -- -- AL    01/09/25 0730 -- 63 14 121/75 100 % -- -- -- AL    01/09/25 0725 -- 57 11 140/83 100 % -- -- -- AL    01/09/25 0720 -- 53 15 137/83 100 % -- -- -- AL    01/09/25 0715 -- 68 9 144/89 100 % -- -- -- AL    01/09/25 0710 -- 52 13 134/84 100 % -- -- -- AL    01/09/25 0705 -- 56 17 137/87 100 % -- None (Room air) -- AL    01/09/25 0700 -- 52 13 141/84 100 % -- None (Room air) --     01/09/25 0655 -- 52 10 148/87 100 % -- None (Room air) --     01/09/25 0650 -- 51 14 150/88 100 % -- None (Room air) --     01/09/25 0645 -- 52 15 164/93 100 % -- None (Room air) --     01/09/25 0630 -- 49 12 152/88 100 % -- None (Room air) --     01/09/25 0615 -- 50 15 150/85 100 % -- None (Room air) --     01/09/25 0610 -- 51 20 148/86 100 % -- None (Room air) --     01/09/25 0605 -- 48 20 136/87 100 % -- None (Room air) --     01/09/25 0600 -- 49 15 143/84 100 % -- None (Room air) --     01/09/25 0555 -- 48 10 151/85 100 % -- None (Room air) --     01/09/25 0550 -- 52 10 150/82 100 % -- None (Room air) --     01/09/25 0545 -- 49 15 138/85 100 % -- None (Room air) --     01/09/25 0535 -- 49 12 136/84 100 % -- None (Room air) --     01/09/25 0530 -- 47 11 179/89 100 % -- None (Room air) --     01/09/25 0515 98.4 °F (36.9 °C) 50 11 192/104 100 % -- None (Room air) --     01/09/25 0506 -- 51 14 185/101 100 % 190 lb (86.2 kg) None (Room air) -- MM       1/9/2025 &P  [facility]             Wander Castro Patient Status:  Emergency    1980 MRN [id number]   Location [facility] EMERGENCY DEPARTMENT Attending Nikolay Garcia MD   Hosp Day # 0 PCP No primary care provider on file.      Chief Complaint: chest pain        Subjective:  History of Present Illness:      Wander Castro is a 44 year old male with PMHx HTN/HLD who presented to the hospital for chest pain. His pain started yesterday when he as helping train his daughter in the garage for basketball. It was a pressure sensation in his substernal region rated 2-6/10 which was decreased with nitroglycerin and had no exacerbating factors. He notes feeling winded and lightheaded and went and sat down before it resolved. He then woke up at 4 AM with the pain again and decided to seek medical eval. He denied any prior stress test but notes a family history of an MI in his father in his 40's.        History/Other:  Past Medical History:  Past Medical History       Past Medical History:    Essential hypertension    Hyperlipidemia        Past Surgical History:   Past Surgical History   History reviewed. No pertinent surgical history.      Family History:   Family History   No family history on file.     Social History:    reports that he has never smoked. He has never used smokeless tobacco. He reports that he does not currently use alcohol. He reports that he does not use drugs.      Allergies: [Allergies]    [Allergies]  No Known Allergies     Medications:  [Medications Ordered Prior to Encounter]    [Medications Ordered Prior to Encounter]  No current facility-administered medications on file prior to encounter.             Current Outpatient Medications on File Prior to Encounter   Medication Sig Dispense Refill    ATORVASTATIN CALCIUM OR Take by mouth.        UNKNOWN TO PATIENT - BLOOD PRESSURE              Review of Systems:   A comprehensive review of systems was completed.    Pertinent positives and negatives noted in the HPI.         Objective:  Physical Exam:    /85   Pulse (!) 48   Temp 98.4 °F (36.9 °C) (Oral)   Resp 10   Ht 5' 10\" (1.778 m)   Wt 190 lb (86.2 kg)   SpO2 100%   BMI 27.26 kg/m²   General: No acute distress, Alert  Respiratory: No rhonchi, no wheezes  Cardiovascular: S1, S2. Regular rate and rhythm  Abdomen: Soft, Non-tender, non-distended, positive bowel sounds  Neuro: No new focal deficits  Extremities: No edema        Results:  Labs:        Labs Last 24 Hours:         Recent Labs   Lab 01/09/25  0509   RBC 5.45   HGB 15.4   HCT 46.0   MCV 84.4   MCH 28.3   MCHC 33.5   RDW 13.2   NEPRELIM 3.90   WBC 8.0   .0             Recent Labs   Lab 01/09/25  0509   *   BUN 14   CREATSERUM 1.00   EGFRCR 94   CA 10.5*   ALB 4.4      K 3.8      CO2 28.0   ALKPHO 55   AST 18   ALT 23   BILT 0.8   TP 7.6         Estimated Glomerular Filtration Rate: 94 mL/min/1.73m2 (by CKD-EPI based on SCr of 1 mg/dL).           Lab Results   Component Value Date     INR 1.11 01/09/2025             Recent Labs   Lab 01/09/25  0509   TROPHS 83*         No results for input(s): \"TROP\", \"PBNP\" in the last 168 hours.     No results for input(s): \"PCT\" in the last 168 hours.     Imaging: Imaging data reviewed in Epic.        Assessment & Plan:  #NSTEMI  -troponin 83  -lipid panel significant for   -EKG showing sinus bradycardia @49 bpm, RBBB  -chest xray without acute process  -cont to trend troponin  -ASA  -pain controled on nitroglycerin gtt with close monitoring of blood pressure  -monitor on telemetry  -on heparin gtt with close monitoring of ptt and titration prn  -cardiology c/s in ED     #HTN  -cont home meds     #HLD  -cont home statin           Plan of care discussed with ED physician     Amy Quevedo DO       1/9/2025 Cardiology Procedure   Pre-procedure Diagnoses   NSTEMI (non-ST elevated myocardial infarction) (HCC) [I21.4]     Post-procedure Diagnoses   NSTEMI (non-ST elevated myocardial infarction)  ([facility]) [I21.4]     Procedures   CATH LEFT HEART CATHETERIZATION [[id number]]          Signed         [facility]     Cardiac Cath Procedure Note        Wander Castro Patient Status:  Inpatient    1980 MRN [id number]   Location [facility] 6NE-A Attending Leslie Hernandes,    Hosp Day # 0 PCP No primary care provider on file.         Cardiologist: Sanchez Juarez MD  Primary Proceduralist: Sanchez Juarez MD  Procedure Performed: [facility]  Date of Procedure: 2025   Indication: NSTEMI     Consent:   Informed written consent was obtained from the patient after risk benefits and alternative explained the patient.  Patient agreed to proceed     Sedation  IV conscious sedation was achieved with Versed and fentanyl.  It was administered under my direct supervision.  Hemodynamic monitoring took place throughout the entire procedure by myself in the Cath Lab staff.  2 mg of Versed and 50mcg of Fentanyl were given.         Description of the procedure: After written informed consent was obtained from the patient, patient was brought to the cardiac catheterization laboratory in a stable condition and fasting state.  Patient was prepped and draped in the usual sterile fashion.  IV conscious sedation was achieved with Versed and fentanyl.  Lidocaine 1% was used to infiltrate the right radial artery for local anesthesia and a 6 Italian introducer sheath was inserted into the right radial artery.       Specimen sent to: No specimen collected  Estimated blood loss: 10 cc  Closure:  TR band     Left Ventriculography and hemodynamics:   LV EDP 12 mmHg  No gradient across aortic valve           Coronary Angiography  RCA:  Large sized. Dominant. Mild CAD.   Left main:  Large, no significant obstructive CAD  Left anterior descending:  Large sized vessel. Has a 70% mid LAD stenosis. Ostium of diagonal appears to have a focal 70% stenosis.   Circumflex:  Anamolous arising from the ostium of RCA. There is  a 99% stenosis pf the second marginal. This is culprit for patient presentation of NSTEMI. JAY 3 flow.         Assessment:     Multivessel CAD involving 70% mid LAD and 99% stenosis of OM2 with anamolous circumflex arising from the RCA         Recommendations:     Recommend cardiothoracic surgery evaluation for possible CABG  Routine post cath care  Resume heparin gtt  Echo  Discussed with patient              Sanchez Juarez MD  01/09/25

## 2025-01-09 NOTE — ED INITIAL ASSESSMENT (HPI)
Chest pain since 1700 radiating to left arm, woke up this am pain continues. BP for /120, PEr EMS 4 baby ASA and 1 SL NTG given

## 2025-01-10 ENCOUNTER — APPOINTMENT (OUTPATIENT)
Dept: CT IMAGING | Facility: HOSPITAL | Age: 45
End: 2025-01-10
Attending: SURGERY
Payer: COMMERCIAL

## 2025-01-10 ENCOUNTER — ANESTHESIA EVENT (OUTPATIENT)
Dept: CARDIAC SURGERY | Facility: HOSPITAL | Age: 45
End: 2025-01-10
Payer: COMMERCIAL

## 2025-01-10 PROBLEM — I10 PRIMARY HYPERTENSION: Chronic | Status: ACTIVE | Noted: 2025-01-10

## 2025-01-10 PROBLEM — E78.00 PURE HYPERCHOLESTEROLEMIA: Chronic | Status: ACTIVE | Noted: 2025-01-10

## 2025-01-10 LAB
ANION GAP SERPL CALC-SCNC: 7 MMOL/L (ref 0–18)
APTT PPP: 41.7 SECONDS (ref 23–36)
APTT PPP: 43.5 SECONDS (ref 23–36)
APTT PPP: 45.9 SECONDS (ref 23–36)
APTT PPP: 88.4 SECONDS (ref 23–36)
BILIRUB UR QL STRIP.AUTO: NEGATIVE
BUN BLD-MCNC: 11 MG/DL (ref 9–23)
C DIFF TOX B STL QL: NEGATIVE
CALCIUM BLD-MCNC: 9.2 MG/DL (ref 8.7–10.4)
CHLORIDE SERPL-SCNC: 108 MMOL/L (ref 98–112)
CLARITY UR REFRACT.AUTO: CLEAR
CO2 SERPL-SCNC: 27 MMOL/L (ref 21–32)
CREAT BLD-MCNC: 0.87 MG/DL
EGFRCR SERPLBLD CKD-EPI 2021: 108 ML/MIN/1.73M2 (ref 60–?)
EST. AVERAGE GLUCOSE BLD GHB EST-MCNC: 123 MG/DL (ref 68–126)
GLUCOSE BLD-MCNC: 114 MG/DL (ref 70–99)
GLUCOSE UR STRIP.AUTO-MCNC: NORMAL MG/DL
HBA1C MFR BLD: 5.9 % (ref ?–5.7)
KETONES UR STRIP.AUTO-MCNC: NEGATIVE MG/DL
LEUKOCYTE ESTERASE UR QL STRIP.AUTO: NEGATIVE
NITRITE UR QL STRIP.AUTO: NEGATIVE
OSMOLALITY SERPL CALC.SUM OF ELEC: 294 MOSM/KG (ref 275–295)
PH UR STRIP.AUTO: 7 [PH] (ref 5–8)
PLATELET # BLD AUTO: 186 10(3)UL (ref 150–450)
POTASSIUM SERPL-SCNC: 3.6 MMOL/L (ref 3.5–5.1)
PROT UR STRIP.AUTO-MCNC: NEGATIVE MG/DL
RBC UR QL AUTO: NEGATIVE
SODIUM SERPL-SCNC: 142 MMOL/L (ref 136–145)
SP GR UR STRIP.AUTO: >1.03 (ref 1–1.03)
TROPONIN I SERPL HS-MCNC: 4134 NG/L
UROBILINOGEN UR STRIP.AUTO-MCNC: NORMAL MG/DL

## 2025-01-10 PROCEDURE — 73206 CT ANGIO UPR EXTRM W/O&W/DYE: CPT | Performed by: SURGERY

## 2025-01-10 PROCEDURE — 99232 SBSQ HOSP IP/OBS MODERATE 35: CPT | Performed by: HOSPITALIST

## 2025-01-10 NOTE — PLAN OF CARE
Received care at approximately 0800. Patient A&Ox4. Room air. NSR on tele. Right radial TR band in place, air removed with no complications. Heparin gtt infusing per Afib/ACS protocol. Nitroglycerin gtt infusing. No complaints of chest pain/SOB.     POC discussed with patient and physicians.

## 2025-01-10 NOTE — PROGRESS NOTES
[facility]   part of [facility]     Hospitalist Progress Note     Wander Castro Patient Status:  Inpatient    1980 MRN [id number]   Location [facility] 6NE-A Attending Leslie Hernandes DO   Hosp Day # 1 PCP No primary care provider on file.     Chief Complaint: Chest pain    Subjective:     Patient seen examined at bedside.  Patient little nervous about having a triple bypass on Monday.  No overnight events or new complaints    Objective:    Review of Systems:   A comprehensive review of systems was completed; pertinent positive and negatives stated in subjective.    Vital signs:  Temp:  [97.3 °F (36.3 °C)-98.4 °F (36.9 °C)] 98.4 °F (36.9 °C)  Pulse:  [61-78] 73  Resp:  [0-22] 19  BP: (111-143)/(37-99) 136/78  SpO2:  [92 %-98 %] 95 %    Physical Exam:    General: No acute distress  Respiratory: No wheezes, no rhonchi  Cardiovascular: S1, S2, regular rate and rhythm  Abdomen: Soft, Non-tender, non-distended, positive bowel sounds  Neuro: No new focal deficits.   Extremities: No edema      Diagnostic Data:    Labs:  Recent Labs   Lab 25  0509 25  0818 01/10/25  0311   WBC 8.0 9.6  --    HGB 15.4 14.8  --    MCV 84.4 82.6  --    .0 200.0 186.0   INR 1.11  --   --        Recent Labs   Lab 25  0509 25  0818 01/10/25  0944   * 121* 114*   BUN 14 13 11   CREATSERUM 1.00 0.89 0.87   CA 10.5* 10.2 9.2   ALB 4.4  --   --     139 142   K 3.8 4.2 3.6    105 108   CO2 28.0 24.0 27.0   ALKPHO 55  --   --    AST 18  --   --    ALT 23  --   --    BILT 0.8  --   --    TP 7.6  --   --        Estimated Glomerular Filtration Rate: 107.8 mL/min/1.73m2 (by CKD-EPI based on SCr of 0.87 mg/dL).    Recent Labs   Lab 25  0700 25  0818 01/10/25  0311   TROPHS 254* 2,217* 4,134*       Recent Labs   Lab 25  0509   PTP 14.4   INR 1.11                  Microbiology    No results found for this visit on 25.      Imaging: Reviewed in Epic.    Medications:     aspirin  81 mg Oral Daily    rosuvastatin  20 mg Oral Nightly       Assessment & Plan:      #STEMI  -Multivessel disease on cath  -Plan is for three-vessel bypass on Monday.  -Cardiology and cardiothoracic surgery following    # Hypertension  -Blood pressure meds on hold with patient being on nitro drip.    # Hyperlipidemia  -Continue statin therapy.      Sancho Fairchild, DO    Supplementary Documentation:     Quality:  DVT Mechanical Prophylaxis:        DVT Pharmacologic Prophylaxis   Medication    heparin (Porcine) 28963 units/250mL infusion ACS/AFIB CONTINUOUS      DVT Pharmacologic prophylaxis: Aspirin 162 mg         Code Status: Not on file  Dunn: No urinary catheter in place  Dunn Duration (in days):   Central line:    RICHARD:     Discharge is dependent on: Clinical improvement  At this point Mr. Castro is expected to be discharge to: Home    The 21st Century Cures Act makes medical notes like these available to patients in the interest of transparency. Please be advised this is a medical document. Medical documents are intended to carry relevant information, facts as evident, and the clinical opinion of the practitioner. The medical note is intended as peer to peer communication and may appear blunt or direct. It is written in medical language and may contain abbreviations or verbiage that are unfamiliar.

## 2025-01-10 NOTE — PROGRESS NOTES
Progress Note  Wander Castro Patient Status:  Inpatient    1980 MRN [id number]   Location [facility] 6NE-A Attending Leslie Hernandes DO   Hosp Day # 1 PCP No primary care provider on file.     Subjective:  No chest pain or pressure  No SOB      Objective:  /77   Pulse 76   Temp 98.6 °F (37 °C) (Temporal)   Resp 20   Ht 5' 10\" (1.778 m)   Wt 190 lb 0.6 oz (86.2 kg)   SpO2 93%   BMI 27.27 kg/m²     Telemetry: NSR, no arrhythmias      Intake/Output:    Intake/Output Summary (Last 24 hours) at 1/10/2025 1347  Last data filed at 1/10/2025 0800  Gross per 24 hour   Intake 4050 ml   Output 1 ml   Net 4049 ml       Last 3 Weights   25 1551 190 lb 0.6 oz (86.2 kg)   25 0506 190 lb (86.2 kg)       Labs:  Recent Labs   Lab 25  0509 25  0818 01/10/25  0944   * 121* 114*   BUN 14 13 11   CREATSERUM 1.00 0.89 0.87   EGFRCR 94 107 108   CA 10.5* 10.2 9.2    139 142   K 3.8 4.2 3.6    105 108   CO2 28.0 24.0 27.0     Recent Labs   Lab 25  0509 25  0818 01/10/25  0311   RBC 5.45 5.29  --    HGB 15.4 14.8  --    HCT 46.0 43.7  --    MCV 84.4 82.6  --    MCH 28.3 28.0  --    MCHC 33.5 33.9  --    RDW 13.2 13.1  --    NEPRELIM 3.90  --   --    WBC 8.0 9.6  --    .0 200.0 186.0         Recent Labs   Lab 25  0700 25  0818 01/10/25  0311   TROPHS 254* 2,217* 4,134*       Diagnostics:             Physical Exam:    Physical Exam  Vitals and nursing note reviewed.   Constitutional:       General: He is not in acute distress.     Appearance: Normal appearance. He is not ill-appearing or diaphoretic.   HENT:      Head: Normocephalic and atraumatic.   Eyes:      Extraocular Movements: Extraocular movements intact.   Cardiovascular:      Rate and Rhythm: Normal rate and regular rhythm.      Heart sounds: No murmur heard.     No friction rub. No gallop.   Pulmonary:      Effort: No respiratory distress.      Breath sounds: Normal breath sounds.    Abdominal:      General: There is no distension.      Palpations: Abdomen is soft.   Musculoskeletal:         General: No swelling. Normal range of motion.      Cervical back: Normal range of motion.   Skin:     General: Skin is warm and dry.      Capillary Refill: Capillary refill takes less than 2 seconds.      Coloration: Skin is not pale.   Neurological:      General: No focal deficit present.      Mental Status: He is alert and oriented to person, place, and time.      Cranial Nerves: No cranial nerve deficit.   Psychiatric:         Mood and Affect: Mood normal.         Medications:   aspirin  81 mg Oral Daily    rosuvastatin  20 mg Oral Nightly      nitroGLYCERIN in dextrose 5% 40 mcg/min (01/10/25 1200)    continuous dose heparin 1,150 Units/hr (01/10/25 1200)    sodium chloride Stopped (01/10/25 1316)       Assessment/Plan:    NSTEMI  MV CAD  ? Left subclavian stenosis - Getting  CTA upper extremity    Cont with ASA and statin  Cont with heparin and nitroglycerin gtt    Discussed in detail with patient and wife.     Will cont to follow    Sanchez Juarez MD  1/10/2025  1:47 PM

## 2025-01-10 NOTE — PLAN OF CARE
Received patient at 0700. Patient neuro intact. Stable on RA. Pt remained normotensive. Nitroglycerin gtt and heparin gtt infusing. Pt had a loose BM, Cdiff negative. Voiding in toilet. Pt bedrest with bathroom privileges. Pt denies any pain. Discussed plan of care with patient and patient's spouse. See doc flow sheet for vital signs and assessments.

## 2025-01-10 NOTE — PLAN OF CARE
Pt received sitting up in bed visiting with family. Pt a/o x4. Tylenol given for complaints of mild headache. Nitroglycerin gtt weaned down. Shortly after pt began complaining of the \"funny feeling\" in his chest again. Rated it 2/10. Pain was nonradiating and there was no complaints of associated n/v or shortness of breath. Nitroglycerin gtt titrated back up. No EKG changes noted on monitor. Vitals remained stable. HR 60-70's NSR with bbb. Heparin gtt adjusted per protocol. TR band removed from right radial cath site. Radial site is soft, c/d/I.  POC reviewed with pt. All questions answered. See Epic for complete assessment.

## 2025-01-10 NOTE — PROGRESS NOTES
OT order received, chart reviewed, discussed case with RN - pt on BR with bathroom privileges only until his CABG Monday. Will clear order and need new consult post op as appropriate.

## 2025-01-10 NOTE — PAYOR COMM NOTE
1/10  CONTINUED STAY REVIEW    Payor: [facility] (NON CONTRACTED IPA)  Subscriber #:  [id number]  Authorization Number: [id number]    Admit date: 1/9/25  Admit time:  8:05 AM    MEDICATIONS ADMINISTERED IN LAST 1 DAY:  acetaminophen (Tylenol Extra Strength) tab 500 mg       Date Action Dose Route User    1/9/2025 2258 Given 500 mg Oral Jen Munoz RN          aspirin chewable tab 81 mg       Date Action Dose Route User    1/10/2025 0809 Given 81 mg Oral Librado Cristina RN          heparin (Porcine) 69750 units/250mL infusion ACS/AFIB CONTINUOUS       Date Action Dose Route User    1/10/2025 1600 Hi-Risk Rate/Dose Verify 1,150 Units/hr Intravenous Librado Cristina RN    1/10/2025 1200 Hi-Risk Rate/Dose Verify 1,150 Units/hr Intravenous Librado Cristina RN    1/10/2025 1013 Hi-Risk Rate/Dose Change 1,150 Units/hr Intravenous Librado Cristina RN    1/10/2025 0800 Hi-Risk Rate/Dose Verify 1,300 Units/hr Intravenous Librado Cristina RN    1/10/2025 0344 New Bag 1,300 Units/hr Intravenous Jen Munoz RN    1/10/2025 0155 Hi-Risk Rate/Dose Change 1,300 Units/hr Intravenous Jen Munoz RN    1/9/2025 2000 Hi-Risk Rate/Dose Verify 1,150 Units/hr Intravenous Jen Munoz RN    1/9/2025 1737 Hi-Risk Rate/Dose Change 1,150 Units/hr Intravenous Jose Ramon Murhpy          iopamidol 76% (ISOVUE-370) injection for power injector       Date Action Dose Route User    1/10/2025 1114 Given 100 mL Intravenous Rehbein Callie          nitroGLYCERIN in dextrose 5% 50 mg/250mL infusion premix       Date Action Dose Route User    1/10/2025 1600 Rate/Dose Verify 40 mcg/min Intravenous Librado Cristina RN    1/10/2025 1200 Rate/Dose Verify 40 mcg/min Intravenous Librado Cristina RN    1/10/2025 0800 Rate/Dose Verify 40 mcg/min Intravenous Librado Cristina RN    1/10/2025 0400 Rate/Dose Verify 40 mcg/min Intravenous Jen Munoz RN    1/10/2025 0253 New Bag 40 mcg/min Intravenous [place],  [place], STEVEN    1/9/2025 2335 Rate/Dose Change 40 mcg/min Intravenous Eri Mcmillan, RN    1/9/2025 2300 Rate/Dose Change 30 mcg/min Intravenous Jen Munoz RN    1/9/2025 2230 Rate/Dose Change 20 mcg/min Intravenous Jen Munoz RN    1/9/2025 2000 Rate/Dose Verify 30 mcg/min Intravenous Jen Munoz RN    1/9/2025 1737 Rate/Dose Change 30 mcg/min Intravenous Saliu, Ardita          rosuvastatin (Crestor) tab 20 mg       Date Action Dose Route User    1/9/2025 2034 Given 20 mg Oral Jen Munoz RN          sodium chloride 0.9% infusion       Date Action Dose Route User    1/10/2025 1200 Rate/Dose Verify (none) Intravenous Librado Cristina RN    1/10/2025 0800 Rate/Dose Verify (none) Intravenous Librado Cristina RN    1/10/2025 0344 New Bag (none) Intravenous Jen Munoz RN    1/9/2025 1738 New Bag (none) Intravenous Saliu, Ardita            Vitals (last day)       Date/Time Temp Pulse Resp BP SpO2 Weight O2 Device O2 Flow Rate (L/min) [facility]    01/10/25 1600 98.1 °F (36.7 °C) 75 23 132/73 93 % -- None (Room air) --     01/10/25 1400 -- 91 17 145/91 96 % -- -- --     01/10/25 1300 -- 76 20 130/77 93 % -- -- --     01/10/25 1200 98.6 °F (37 °C) 68 14 116/74 93 % -- None (Room air) --     01/10/25 1100 -- 73 19 136/78 95 % -- -- --     01/10/25 1000 -- 73 15 132/76 96 % -- -- --     01/10/25 0900 -- 76 17 131/72 94 % -- -- --     01/10/25 0800 98.4 °F (36.9 °C) 78 16 124/75 95 % -- None (Room air) --     01/10/25 0700 -- 76 16 127/37 95 % -- -- -- MP    01/10/25 0600 -- 67 20 111/74 93 % -- -- -- SD    01/10/25 0500 -- 70 17 124/78 94 % -- -- -- SD    01/10/25 0400 97.9 °F (36.6 °C) 66 18 114/74 94 % -- None (Room air) -- SD    01/10/25 0300 -- 65 22 122/71 93 % -- -- -- SD    01/10/25 0200 -- 69 0 128/76 93 % -- -- -- SD    01/10/25 0100 -- 73 17 120/72 92 % -- -- -- SD    01/10/25 0000 98 °F (36.7 °C) 70 16 124/75 93 % -- None (Room air) -- SD    01/09/25 2300 --  74 18 135/84 96 % -- -- -- SD    01/09/25 2200 -- 71 17 126/77 94 % -- -- -- SD    01/09/25 2100 -- 70 19 131/80 96 % -- -- -- SD    01/09/25 2030 -- 71 13 130/72 96 % -- -- -- SD    01/09/25 2000 98.1 °F (36.7 °C) 67 12 -- 95 % -- None (Room air) -- SD    01/09/25 1900 -- 67 13 124/86 95 % -- -- -- AS    01/09/25 1800 -- 70 14 135/85 96 % -- -- -- AS    01/09/25 1700 -- 68 13 128/82 96 % -- -- -- AS    01/09/25 1600 97.6 °F (36.4 °C) 70 18 135/84 96 % -- None (Room air) -- AS    01/09/25 1551 -- -- -- -- -- 190 lb 0.6 oz (86.2 kg) -- -- AS    01/09/25 1500 -- 63 14 122/84 96 % -- -- -- AS    01/09/25 1400 -- 67 17 130/80 96 % -- -- -- AS    01/09/25 1336 -- -- -- 143/88 -- -- -- -- SUREKHA    01/09/25 1336 -- 67 15 -- 98 % -- -- -- AS    01/09/25 1335 -- -- -- 128/88 -- -- -- -- SUREKHA    01/09/25 1300 -- 64 16 142/88 96 % -- -- -- AS    01/09/25 1200 97.3 °F (36.3 °C) 61 13 129/99 95 % -- None (Room air) -- AS    01/09/25 1130 -- 65 11 129/84 95 % -- -- -- AS    01/09/25 1115 -- -- -- 125/81 95 % -- None (Room air) -- AS    01/09/25 0900 -- 65 9 137/81 95 % -- -- -- AS    01/09/25 0800 97.9 °F (36.6 °C) 56 15 123/77 100 % -- None (Room air) -- AS    01/09/25 0740 -- 53 15 135/81 100 % -- None (Room air) -- AL    01/09/25 0735 -- 60 12 119/74 100 % -- -- -- AL    01/09/25 0730 -- 63 14 121/75 100 % -- -- -- AL    01/09/25 0725 -- 57 11 140/83 100 % -- -- -- AL    01/09/25 0720 -- 53 15 137/83 100 % -- -- -- AL    01/09/25 0715 -- 68 9 144/89 100 % -- -- -- AL    01/09/25 0710 -- 52 13 134/84 100 % -- -- -- AL    01/09/25 0705 -- 56 17 137/87 100 % -- None (Room air) -- AL    01/09/25 0700 -- 52 13 141/84 100 % -- None (Room air) --     01/09/25 0655 -- 52 10 148/87 100 % -- None (Room air) --     01/09/25 0650 -- 51 14 150/88 100 % -- None (Room air) --     01/09/25 0645 -- 52 15 164/93 100 % -- None (Room air) --     01/09/25 0630 -- 49 12 152/88 100 % -- None (Room air) --     01/09/25 0615 -- 50 15 150/85 100 %  -- None (Room air) --     01/09/25 0610 -- 51 20 148/86 100 % -- None (Room air) --     01/09/25 0605 -- 48 20 136/87 100 % -- None (Room air) --     01/09/25 0600 -- 49 15 143/84 100 % -- None (Room air) --     01/09/25 0555 -- 48 10 151/85 100 % -- None (Room air) --     01/09/25 0550 -- 52 10 150/82 100 % -- None (Room air) --     01/09/25 0545 -- 49 15 138/85 100 % -- None (Room air) --     01/09/25 0535 -- 49 12 136/84 100 % -- None (Room air) --     01/09/25 0530 -- 47 11 179/89 100 % -- None (Room air) --     01/09/25 0515 98.4 °F (36.9 °C) 50 11 192/104 100 % -- None (Room air) --     01/09/25 0506 -- 51 14 185/101 100 % 190 lb (86.2 kg) None (Room air) -- MM       1/10 HOSPITALIST NOTE       Subjective:  Patient seen examined at bedside.  Patient little nervous about having a triple bypass on Monday.  No overnight events or new complaints        Objective:  Review of Systems:   A comprehensive review of systems was completed; pertinent positive and negatives stated in subjective.     Vital signs:  Temp:  [97.3 °F (36.3 °C)-98.4 °F (36.9 °C)] 98.4 °F (36.9 °C)  Pulse:  [61-78] 73  Resp:  [0-22] 19  BP: (111-143)/(37-99) 136/78  SpO2:  [92 %-98 %] 95 %     Physical Exam:    General: No acute distress  Respiratory: No wheezes, no rhonchi  Cardiovascular: S1, S2, regular rate and rhythm  Abdomen: Soft, Non-tender, non-distended, positive bowel sounds  Neuro: No new focal deficits.   Extremities: No edema        Diagnostic Data:    Labs:        Recent Labs   Lab 01/09/25  0509 01/09/25  0818 01/10/25  0311   WBC 8.0 9.6  --    HGB 15.4 14.8  --    MCV 84.4 82.6  --    .0 200.0 186.0   INR 1.11  --   --                Recent Labs   Lab 01/09/25  0509 01/09/25  0818 01/10/25  0944   * 121* 114*   BUN 14 13 11   CREATSERUM 1.00 0.89 0.87   CA 10.5* 10.2 9.2   ALB 4.4  --   --     139 142   K 3.8 4.2 3.6    105 108   CO2 28.0 24.0 27.0   ALKPHO 55  --   --    AST 18  --    --    ALT 23  --   --    BILT 0.8  --   --    TP 7.6  --   --               Recent Labs   Lab 01/09/25  0700 01/09/25  0818 01/10/25  0311   TROPHS 254* 2,217* 4,134*           Assessment & Plan:  #STEMI  -Multivessel disease on cath  -Plan is for three-vessel bypass on Monday.  -Cardiology and cardiothoracic surgery following     # Hypertension  -Blood pressure meds on hold with patient being on nitro drip.     # Hyperlipidemia  -Continue statin therapy.      1/10 CARDIOLOGY NOTE    Subjective:  No chest pain or pressure  No SOB        Objective:  /77   Pulse 76   Temp 98.6 °F (37 °C) (Temporal)   Resp 20   Ht 5' 10\" (1.778 m)   Wt 190 lb 0.6 oz (86.2 kg)   SpO2 93%   BMI 27.27 kg/m²      Telemetry: NSR, no arrhythmias        Intake/Output:     Intake/Output Summary (Last 24 hours) at 1/10/2025 1347  Last data filed at 1/10/2025 0800      Gross per 24 hour   Intake 4050 ml   Output 1 ml   Net 4049 ml           Assessment/Plan:     NSTEMI  MV CAD  ? Left subclavian stenosis - Getting  CTA upper extremity     Cont with ASA and statin  Cont with heparin and nitroglycerin gtt     Discussed in detail with patient and wife.      1/10 CARDIOTHORACIC NOTE  Plan for CABG Monday.      Kary Barros PA-C   Cardiothoracic Surgery

## 2025-01-10 NOTE — PHYSICAL THERAPY NOTE
PT order received, chart reviewed, discussed case with RN - pt on BR with bathroom privileges only until his CABG Monday. Will clear order and need new consult post op as appropriate.

## 2025-01-11 LAB
APTT PPP: 50.7 SECONDS (ref 23–36)
ATRIAL RATE: 74 BPM
P AXIS: -10 DEGREES
P-R INTERVAL: 166 MS
PLATELET # BLD AUTO: 163 10(3)UL (ref 150–450)
Q-T INTERVAL: 416 MS
QRS DURATION: 158 MS
QTC CALCULATION (BEZET): 461 MS
R AXIS: 89 DEGREES
T AXIS: 28 DEGREES
TROPONIN I SERPL HS-MCNC: 2125 NG/L
VENTRICULAR RATE: 74 BPM

## 2025-01-11 PROCEDURE — 99232 SBSQ HOSP IP/OBS MODERATE 35: CPT | Performed by: HOSPITALIST

## 2025-01-11 NOTE — PROGRESS NOTES
[address] [address]  ?  01/11/25  ?  Re: Wander Castro  ?  To Whom It May Concern:    Wander Castro was admitted to [facility] from 1/9/2025 to current    Please excuse Wander Castro from attending work for these days.      Patient will follow up with their cardiothoriacic surgery upon discharge.   Further restrictions and work excuse will be based on primary care physician's evaluation.  ?  Thank you,    Sancho Fairchild DO, MD  [facility]

## 2025-01-11 NOTE — PLAN OF CARE
No acute change overnight- denies dyspnea or chest discomfort. NTG at 40mcg /min & Heparin infusing. Tylenol 500 mg q 4 hours given for mild headache. NSR; SBP >90.  Plan for CABG next week.

## 2025-01-11 NOTE — PROGRESS NOTES
Progress Note  Wander Castro Patient Status:  Inpatient    1980 MRN [id number]   Location [facility] 6NE-A Attending Leslie Hernandes DO   Hosp Day # 2 PCP No primary care provider on file.     Subjective:  No chest pain overnight, no complaints      Objective:  /80   Pulse 64   Temp 98.1 °F (36.7 °C) (Temporal)   Resp 17   Ht 5' 10\" (1.778 m)   Wt 190 lb 0.6 oz (86.2 kg)   SpO2 94%   BMI 27.27 kg/m²     Telemetry: NSR, no arrhythmias      Intake/Output:    Intake/Output Summary (Last 24 hours) at 2025 0707  Last data filed at 2025 0403  Gross per 24 hour   Intake 2523 ml   Output --   Net 2523 ml       Last 3 Weights   25 1551 190 lb 0.6 oz (86.2 kg)   25 0506 190 lb (86.2 kg)       Labs:  Recent Labs   Lab 25  [id number] 01/10/25  0944   * 121* 114*   BUN 14 13 11   CREATSERUM 1.00 0.89 0.87   EGFRCR 94 107 108   CA 10.5* 10.2 9.2    139 142   K 3.8 4.2 3.6    105 108   CO2 28.0 24.0 27.0     Recent Labs   Lab 25  05025  0818 01/10/25  03125  0559   RBC 5.45 5.29  --   --    HGB 15.4 14.8  --   --    HCT 46.0 43.7  --   --    MCV 84.4 82.6  --   --    MCH 28.3 28.0  --   --    MCHC 33.5 33.9  --   --    RDW 13.2 13.1  --   --    NEPRELIM 3.90  --   --   --    WBC 8.0 9.6  --   --    .0 200.0 186.0 163.0         Recent Labs   Lab 25  0818 01/10/25  0311 25  0559   TROPHS 2,217* 4,134* 2,125*       Diagnostics:             Physical Exam:    Physical Exam  Vitals and nursing note reviewed.   Constitutional:       General: He is not in acute distress.     Appearance: Normal appearance. He is not ill-appearing or diaphoretic.   HENT:      Head: Normocephalic and atraumatic.   Eyes:      Extraocular Movements: Extraocular movements intact.   Cardiovascular:      Rate and Rhythm: Normal rate and regular rhythm.      Heart sounds: No murmur heard.     No friction rub. No gallop.   Pulmonary:       Effort: No respiratory distress.      Breath sounds: Normal breath sounds.   Abdominal:      General: There is no distension.      Palpations: Abdomen is soft.   Musculoskeletal:         General: No swelling. Normal range of motion.      Cervical back: Normal range of motion.   Skin:     General: Skin is warm and dry.      Capillary Refill: Capillary refill takes less than 2 seconds.      Coloration: Skin is not pale.   Neurological:      General: No focal deficit present.      Mental Status: He is alert and oriented to person, place, and time.      Cranial Nerves: No cranial nerve deficit.   Psychiatric:         Mood and Affect: Mood normal.         Medications:   aspirin  81 mg Oral Daily    rosuvastatin  20 mg Oral Nightly      nitroGLYCERIN in dextrose 5% 40 mcg/min (01/10/25 2220)    continuous dose heparin 1,450 Units/hr (01/11/25 0000)    sodium chloride Stopped (01/10/25 1316)       Assessment/Plan:    NSTEMI  MV CAD  3.    Left subclavian stenosis     Cont with ASA and statin  Cont with heparin and nitroglycerin gtt  CTA of LUE shows mild extrinsic compression of L subclavian artery distal to LIMA  CABG monday    Will cont to follow    Rashi Bowman MD  1/11/2025

## 2025-01-11 NOTE — PROGRESS NOTES
Morning troponin 2125, trending down  Dr Juares at bedside, patient reports feeling good, denies any chest pain overnight, Heparin and NTG infusing.  Plan for CABG on Monday

## 2025-01-11 NOTE — SPIRITUAL CARE NOTE
Spiritual Care Visit Note    Patient Name: Wander Castro Date of Spiritual Care Visit: 25   : 1980 Primary Dx: NSTEMI (non-ST elevated myocardial infarction) (HCC)       Referred By: Referral From: Patient;Nurse    Spiritual Care Taxonomy:    Intended Effects: Aligning care plan with patient's values    Methods: Offer spiritual/Confucianism support;Collaborate with care team member    Interventions: Acknowledge current situation;Acknowledge response to difficult experience;Active listening;Ask guided questions about tab;Ask guided questions;Assist with identifying strengths;Communicate patient's needs/concerns to others;Explain  role;Identify supportive relationship(s);Invite someone to reminisce;Provide compassionate touch;Prayer for healing;[place] for care team member(s);Share words of hope and inspiration    Visit Type/Summary:  Responded to call from RN to visit a patient who requested a  visit.  Offered a compassionate, listening presence.  Wander was a bit teary eyed at times.  He expressed great love for his family and also concerns about surgery/the future.  Wander has a strong Latter day heritage and is searching possibly for a deeper connection to God/Zack.  He desired prayer and also Latter day resources.  Shared some resources that are available on-line that may be helpful.       - Spiritual Care: Responded to a request for spiritual care and assessed the patient for spiritual care needs. Responded to a request via the on call phone Consulted with RN prior to visit. Offered empathic listening and emotional support. Provided information regarding how to contact Spiritual Care and left a Spiritual Care information card. Provided support for Patient's spiritual/Confucianism requests. Coordinated Latter day Communion and verified NPO status. Offered prayer. Provided Confucianism resources/items, per Patient's request.   remains available for follow up.    Spiritual Care support can  be requested via an Epic consult. For urgent/immediate needs, please contact the On Call  at: Edward: [phone]

## 2025-01-11 NOTE — PROGRESS NOTES
[facility]   part of [facility] Hospitalist Progress Note     Wander Castro Patient Status:  Inpatient    1980 MRN [id number]   Location [facility] 6NE-A Attending Leslie Hernandes DO   Hosp Day # 2 PCP No primary care provider on file.     Chief Complaint: Chest pain    Subjective:     Patient seen examined at bedside.  Patient little nervous about having a triple bypass on Monday.  No overnight events or new complaints    Objective:    Review of Systems:   A comprehensive review of systems was completed; pertinent positive and negatives stated in subjective.    Vital signs:  Temp:  [98.1 °F (36.7 °C)-98.6 °F (37 °C)] 98.2 °F (36.8 °C)  Pulse:  [62-99] 74  Resp:  [12-27] 27  BP: (114-157)/() 129/78  SpO2:  [93 %-96 %] 95 %    Physical Exam:    General: No acute distress  Respiratory: No wheezes, no rhonchi  Cardiovascular: S1, S2, regular rate and rhythm  Abdomen: Soft, Non-tender, non-distended, positive bowel sounds  Neuro: No new focal deficits.   Extremities: No edema      Diagnostic Data:    Labs:  Recent Labs   Lab 25  0509 25  0818 01/10/25  0311 25  0559   WBC 8.0 9.6  --   --    HGB 15.4 14.8  --   --    MCV 84.4 82.6  --   --    .0 200.0 186.0 163.0   INR 1.11  --   --   --        Recent Labs   Lab 25  0509 25  0818 01/10/25  0944   * 121* 114*   BUN 14 13 11   CREATSERUM 1.00 0.89 0.87   CA 10.5* 10.2 9.2   ALB 4.4  --   --     139 142   K 3.8 4.2 3.6    105 108   CO2 28.0 24.0 27.0   ALKPHO 55  --   --    AST 18  --   --    ALT 23  --   --    BILT 0.8  --   --    TP 7.6  --   --        Estimated Glomerular Filtration Rate: 107.8 mL/min/1.73m2 (by CKD-EPI based on SCr of 0.87 mg/dL).    Recent Labs   Lab 25  0818 01/10/25  0311 25  0559   TROPHS 2,217* 4,134* 2,125*       Recent Labs   Lab 25  0509   PTP 14.4   INR 1.11                  Microbiology    No results found for this visit on  01/09/25.      Imaging: Reviewed in Epic.    Medications:    aspirin  81 mg Oral Daily    rosuvastatin  20 mg Oral Nightly       Assessment & Plan:      #STEMI  -Multivessel disease on cath  -Plan is for three-vessel bypass on Monday.  -Cardiology and cardiothoracic surgery following    # Hypertension  -Blood pressure meds on hold with patient being on nitro drip.    # Hyperlipidemia  -Continue statin therapy.      Sancho Fairchild, DO    Supplementary Documentation:     Quality:  DVT Mechanical Prophylaxis:        DVT Pharmacologic Prophylaxis   Medication    heparin (Porcine) 31845 units/250mL infusion ACS/AFIB CONTINUOUS      DVT Pharmacologic prophylaxis: Aspirin 162 mg         Code Status: Not on file  Dunn: No urinary catheter in place  Dunn Duration (in days):   Central line:    RICHARD:     Discharge is dependent on: Clinical improvement  At this point Mr. Castro is expected to be discharge to: Home    The 21st Century Cures Act makes medical notes like these available to patients in the interest of transparency. Please be advised this is a medical document. Medical documents are intended to carry relevant information, facts as evident, and the clinical opinion of the practitioner. The medical note is intended as peer to peer communication and may appear blunt or direct. It is written in medical language and may contain abbreviations or verbiage that are unfamiliar.

## 2025-01-11 NOTE — SPIRITUAL CARE NOTE
Spiritual Care Visit Note    Patient Name: Wander Castro Date of Spiritual Care Visit: 25   : 1980 Primary Dx: NSTEMI (non-ST elevated myocardial infarction) (HCC)       Referred By: Referral From: Patient;Nurse    Spiritual Care Taxonomy:    Intended Effects: Aligning care plan with patient's values    Methods: Offer spiritual/Confucianism support;Collaborate with care team member    Interventions: Acknowledge current situation;Acknowledge response to difficult experience;Active listening;Ask guided questions about tab;Ask guided questions;Assist with identifying strengths;Communicate patient's needs/concerns to others;Explain  role;Identify supportive relationship(s);Invite someone to reminisce;Provide compassionate touch;Prayer for healing;[place] for care team member(s);Share words of hope and inspiration    Visit Type/Summary:     - Spiritual Care: Provided support for Patient's spiritual/Confucianism requests. Coordinated Caodaism Communion and verified NPO status. Coordinated  visit for Sacrament of the Sick.  remains available for follow up.    Spiritual Care support can be requested via an Epic consult. For urgent/immediate needs, please contact the On Call  at: Edward: ext [phone]

## 2025-01-12 LAB
ANION GAP SERPL CALC-SCNC: 7 MMOL/L (ref 0–18)
ANTIBODY SCREEN: NEGATIVE
APTT PPP: 45.3 SECONDS (ref 23–36)
APTT PPP: 47.7 SECONDS (ref 23–36)
APTT PPP: 54.5 SECONDS (ref 23–36)
ATRIAL RATE: 64 BPM
BUN BLD-MCNC: 12 MG/DL (ref 9–23)
CALCIUM BLD-MCNC: 9.4 MG/DL (ref 8.7–10.4)
CHLORIDE SERPL-SCNC: 110 MMOL/L (ref 98–112)
CO2 SERPL-SCNC: 25 MMOL/L (ref 21–32)
CREAT BLD-MCNC: 0.86 MG/DL
EGFRCR SERPLBLD CKD-EPI 2021: 108 ML/MIN/1.73M2 (ref 60–?)
ERYTHROCYTE [DISTWIDTH] IN BLOOD BY AUTOMATED COUNT: 13.2 %
GLUCOSE BLD-MCNC: 106 MG/DL (ref 70–99)
HCT VFR BLD AUTO: 39.3 %
HGB BLD-MCNC: 13.2 G/DL
MCH RBC QN AUTO: 28.2 PG (ref 26–34)
MCHC RBC AUTO-ENTMCNC: 33.6 G/DL (ref 31–37)
MCV RBC AUTO: 84 FL
OSMOLALITY SERPL CALC.SUM OF ELEC: 294 MOSM/KG (ref 275–295)
P AXIS: -15 DEGREES
P-R INTERVAL: 174 MS
PLATELET # BLD AUTO: 192 10(3)UL (ref 150–450)
PLATELET # BLD AUTO: 192 10(3)UL (ref 150–450)
POTASSIUM SERPL-SCNC: 3.9 MMOL/L (ref 3.5–5.1)
Q-T INTERVAL: 446 MS
QRS DURATION: 162 MS
QTC CALCULATION (BEZET): 460 MS
R AXIS: 100 DEGREES
RBC # BLD AUTO: 4.68 X10(6)UL
RH BLOOD TYPE: POSITIVE
RH BLOOD TYPE: POSITIVE
SODIUM SERPL-SCNC: 142 MMOL/L (ref 136–145)
T AXIS: -1 DEGREES
TROPONIN I SERPL HS-MCNC: 1343 NG/L
VENTRICULAR RATE: 64 BPM
WBC # BLD AUTO: 8.1 X10(3) UL (ref 4–11)

## 2025-01-12 PROCEDURE — 99232 SBSQ HOSP IP/OBS MODERATE 35: CPT | Performed by: HOSPITALIST

## 2025-01-12 NOTE — PLAN OF CARE
Assumed care of pt at 0730. Pt Aox4, VSS on RA, NSR on nitroglycerin and heparin gtt. No chest pain, tylenol given for headache. Pt and family updated on POC.     Problem: Patient/Family Goals  Goal: Patient/Family Long Term Goal  Description: Patient's Long Term Goal: Have successful CABG and recovery    Interventions:  -   - See additional Care Plan goals for specific interventions  Outcome: Progressing  Goal: Patient/Family Short Term Goal  Description: Patient's Short Term Goal: be chest pain free    Interventions:   -   - See additional Care Plan goals for specific interventions  Outcome: Progressing     Problem: CARDIOVASCULAR - ADULT  Goal: Maintains optimal cardiac output and hemodynamic stability  Description: INTERVENTIONS:  - Monitor vital signs, rhythm, and trends  - Monitor for bleeding, hypotension and signs of decreased cardiac output  - Evaluate effectiveness of vasoactive medications to optimize hemodynamic stability  - Monitor arterial and/or venous puncture sites for bleeding and/or hematoma  - Assess quality of pulses, skin color and temperature  - Assess for signs of decreased coronary artery perfusion - ex. Angina  - Evaluate fluid balance, assess for edema, trend weights  Outcome: Progressing  Goal: Absence of cardiac arrhythmias or at baseline  Description: INTERVENTIONS:  - Continuous cardiac monitoring, monitor vital signs, obtain 12 lead EKG if indicated  - Evaluate effectiveness of antiarrhythmic and heart rate control medications as ordered  - Initiate emergency measures for life threatening arrhythmias  - Monitor electrolytes and administer replacement therapy as ordered  Outcome: Progressing     Problem: METABOLIC/FLUID AND ELECTROLYTES - ADULT  Goal: Electrolytes maintained within normal limits  Description: INTERVENTIONS:  - Monitor labs and rhythm and assess patient for signs and symptoms of electrolyte imbalances  - Administer electrolyte replacement as ordered  - Monitor response  to electrolyte replacements, including rhythm and repeat lab results as appropriate  - Fluid restriction as ordered  - Instruct patient on fluid and nutrition restrictions as appropriate  Outcome: Progressing  Goal: Hemodynamic stability and optimal renal function maintained  Description: INTERVENTIONS:  - Monitor labs and assess for signs and symptoms of volume excess or deficit  - Monitor intake, output and patient weight  - Monitor urine specific gravity, serum osmolarity and serum sodium as indicated or ordered  - Monitor response to interventions for patient's volume status, including labs, urine output, blood pressure (other measures as available)  - Encourage oral intake as appropriate  - Instruct patient on fluid and nutrition restrictions as appropriate  Outcome: Progressing     Problem: HEMATOLOGIC - ADULT  Goal: Maintains hematologic stability  Description: INTERVENTIONS  - Assess for signs and symptoms of bleeding or hemorrhage  - Monitor labs and vital signs for trends  - Administer supportive blood products/factors, fluids and medications as ordered and appropriate  - Administer supportive blood products/factors as ordered and appropriate  Outcome: Progressing

## 2025-01-12 NOTE — PROGRESS NOTES
Progress Note  Wander Castro Patient Status:  Inpatient    1980 MRN [id number]   Location [facility] 6NE-A Attending Leslie Hernandes DO   Hosp Day # 3 PCP No primary care provider on file.     Subjective:  No chest pain overnight, no complaints      Objective:  /87   Pulse 65   Temp 98 °F (36.7 °C) (Temporal)   Resp 16   Ht 5' 10\" (1.778 m)   Wt 190 lb 0.6 oz (86.2 kg)   SpO2 92%   BMI 27.27 kg/m²     Telemetry: NSR, no arrhythmias      Intake/Output:    Intake/Output Summary (Last 24 hours) at 2025 1002  Last data filed at 2025 0600  Gross per 24 hour   Intake 968.9 ml   Output --   Net 968.9 ml       Last 3 Weights   25 1551 190 lb 0.6 oz (86.2 kg)   25 0506 190 lb (86.2 kg)       Labs:  Recent Labs   Lab 25  0818 01/10/25  0944 25  0414   * 114* 106*   BUN 13 11 12   CREATSERUM 0.89 0.87 0.86   EGFRCR 107 108 108   CA 10.2 9.2 9.4    142 142   K 4.2 3.6 3.9    108 110   CO2 24.0 27.0 25.0     Recent Labs   Lab 25  0509 25  0818 01/10/25  0311 25  0559 25  0414   RBC 5.45 5.29  --   --  4.68   HGB 15.4 14.8  --   --  13.2   HCT 46.0 43.7  --   --  39.3   MCV 84.4 82.6  --   --  84.0   MCH 28.3 28.0  --   --  28.2   MCHC 33.5 33.9  --   --  33.6   RDW 13.2 13.1  --   --  13.2   NEPRELIM 3.90  --   --   --   --    WBC 8.0 9.6  --   --  8.1   .0 200.0 186.0 163.0 192.0  192.0         Recent Labs   Lab 01/10/25  0311 25  0559 25  0414   TROPHS 4,134* 2,125* 1,343*       Diagnostics:             Physical Exam:    Physical Exam  Vitals and nursing note reviewed.   Constitutional:       General: He is not in acute distress.     Appearance: Normal appearance. He is not ill-appearing or diaphoretic.   HENT:      Head: Normocephalic and atraumatic.   Eyes:      Extraocular Movements: Extraocular movements intact.   Cardiovascular:      Rate and Rhythm: Normal rate and regular rhythm.      Heart sounds:  No murmur heard.     No friction rub. No gallop.   Pulmonary:      Effort: No respiratory distress.      Breath sounds: Normal breath sounds.   Abdominal:      General: There is no distension.      Palpations: Abdomen is soft.   Musculoskeletal:         General: No swelling. Normal range of motion.      Cervical back: Normal range of motion.   Skin:     General: Skin is warm and dry.      Capillary Refill: Capillary refill takes less than 2 seconds.      Coloration: Skin is not pale.   Neurological:      General: No focal deficit present.      Mental Status: He is alert and oriented to person, place, and time.      Cranial Nerves: No cranial nerve deficit.   Psychiatric:         Mood and Affect: Mood normal.         Medications:   mupirocin  1 Application Nasal Now then every 0500    aspirin  81 mg Oral Daily    rosuvastatin  20 mg Oral Nightly      nitroGLYCERIN in dextrose 5% 60 mcg/min (01/12/25 0630)    continuous dose heparin 1,600 Units/hr (01/12/25 0642)    sodium chloride Stopped (01/10/25 1316)       Assessment/Plan:    NSTEMI  MV CAD  3.    Left subclavian stenosis     Cont with ASA and statin  Cont with heparin and nitroglycerin gtt  CTA of LUE shows mild extrinsic compression of L subclavian artery distal to LIMA  CABG tomorrow  No changes to treatment plan today    Will cont to follow    Rashi Bowman MD  1/11/2025

## 2025-01-12 NOTE — PLAN OF CARE
Assumed care of pt. At 1930. Pt. A & O x4. VSS. Denies of any chest pain, nitroglycerin titrated. C/o some mild HA, relieved w/ PRN medication. Heparin infusing, therapeutic per ACS protocol. ALEJANDRA. Plan for CABG on Monday. Will continue to monitor closely.

## 2025-01-12 NOTE — PROGRESS NOTES
[facility]   part of [facility]ist Progress Note     Wander Castro Patient Status:  Inpatient    1980 MRN [id number]   Location [facility] 6NE-A Attending Leslie Hernandes DO   Hosp Day # 3 PCP No primary care provider on file.     Chief Complaint: Chest pain    Subjective:     Patient seen examined at bedside.  Patient little nervous about having a triple bypass on Monday.  No overnight events or new complaints    Objective:    Review of Systems:   A comprehensive review of systems was completed; pertinent positive and negatives stated in subjective.    Vital signs:  Temp:  [97.8 °F (36.6 °C)-98.3 °F (36.8 °C)] 98.2 °F (36.8 °C)  Pulse:  [59-77] 72  Resp:  [0-22] 17  BP: (119-178)/(70-94) 147/94  SpO2:  [91 %-97 %] 94 %    Physical Exam:    General: No acute distress  Respiratory: No wheezes, no rhonchi  Cardiovascular: S1, S2, regular rate and rhythm  Abdomen: Soft, Non-tender, non-distended, positive bowel sounds  Neuro: No new focal deficits.   Extremities: No edema      Diagnostic Data:    Labs:  Recent Labs   Lab 25  0509 25  0818 01/10/25  0311 25  0559 25  0414   WBC 8.0 9.6  --   --  8.1   HGB 15.4 14.8  --   --  13.2   MCV 84.4 82.6  --   --  84.0   .0 200.0 186.0 163.0 192.0  192.0   INR 1.11  --   --   --   --        Recent Labs   Lab 25  0509 25  0818 01/10/25  0944 25  0414   * 121* 114* 106*   BUN 14 13 11 12   CREATSERUM 1.00 0.89 0.87 0.86   CA 10.5* 10.2 9.2 9.4   ALB 4.4  --   --   --     139 142 142   K 3.8 4.2 3.6 3.9    105 108 110   CO2 28.0 24.0 27.0 25.0   ALKPHO 55  --   --   --    AST 18  --   --   --    ALT 23  --   --   --    BILT 0.8  --   --   --    TP 7.6  --   --   --        Estimated Glomerular Filtration Rate: 108.1 mL/min/1.73m2 (by CKD-EPI based on SCr of 0.86 mg/dL).    Recent Labs   Lab 01/10/25  0311 25  0559 25  0414   TROPHS 4,134* 2,125* 1,343*       Recent Labs    Lab 01/09/25  0509   PTP 14.4   INR 1.11          Microbiology    No results found for this visit on 01/09/25.      Imaging: Reviewed in Epic.    Medications:    mupirocin  1 Application Nasal Now then every 0500    aspirin  81 mg Oral Daily    rosuvastatin  20 mg Oral Nightly       Assessment & Plan:      #STEMI  -Multivessel disease on cath  -Plan is for three-vessel bypass on Monday.  -Cardiology and cardiothoracic surgery following    # Hypertension  -Blood pressure meds on hold with patient being on nitro drip.    # Hyperlipidemia  -Continue statin therapy.      Sancho Fairchild, DO    Supplementary Documentation:     Quality:  DVT Mechanical Prophylaxis:        DVT Pharmacologic Prophylaxis   Medication    heparin (Porcine) 17572 units/250mL infusion ACS/AFIB CONTINUOUS      DVT Pharmacologic prophylaxis: Aspirin 162 mg         Code Status: Not on file  Dunn: No urinary catheter in place  Dunn Duration (in days):   Central line:    RICHARD:     Discharge is dependent on: Clinical improvement  At this point Mr. Castro is expected to be discharge to: Home    The 21st Century Cures Act makes medical notes like these available to patients in the interest of transparency. Please be advised this is a medical document. Medical documents are intended to carry relevant information, facts as evident, and the clinical opinion of the practitioner. The medical note is intended as peer to peer communication and may appear blunt or direct. It is written in medical language and may contain abbreviations or verbiage that are unfamiliar.

## 2025-01-13 ENCOUNTER — APPOINTMENT (OUTPATIENT)
Dept: GENERAL RADIOLOGY | Facility: HOSPITAL | Age: 45
End: 2025-01-13
Attending: PHYSICIAN ASSISTANT
Payer: COMMERCIAL

## 2025-01-13 ENCOUNTER — ANESTHESIA (OUTPATIENT)
Dept: CARDIAC SURGERY | Facility: HOSPITAL | Age: 45
End: 2025-01-13
Payer: COMMERCIAL

## 2025-01-13 LAB
APTT PPP: 26.7 SECONDS (ref 23–36)
APTT PPP: 27.1 SECONDS (ref 23–36)
APTT PPP: 65.5 SECONDS (ref 23–36)
ATRIAL RATE: 63 BPM
BASE EXCESS BLD CALC-SCNC: -10 MMOL/L
BASE EXCESS BLDA CALC-SCNC: -1 MMOL/L (ref ?–30)
BASE EXCESS BLDA CALC-SCNC: -4.1 MMOL/L (ref ?–2)
BASE EXCESS BLDA CALC-SCNC: -6.6 MMOL/L (ref ?–2)
BASE EXCESS BLDA CALC-SCNC: 0 MMOL/L (ref ?–30)
BASE EXCESS BLDA CALC-SCNC: 1 MMOL/L (ref ?–30)
BODY TEMPERATURE: 98.6 F
BODY TEMPERATURE: 98.6 F
BUN BLD-MCNC: 13 MG/DL (ref 9–23)
CA-I BLD-SCNC: 1.03 MMOL/L (ref 1.12–1.32)
CA-I BLD-SCNC: 1.06 MMOL/L (ref 0.95–1.32)
CA-I BLD-SCNC: 1.27 MMOL/L (ref 0.95–1.32)
CA-I BLDA-SCNC: 1.07 MMOL/L (ref 1.12–1.32)
CA-I BLDA-SCNC: 1.11 MMOL/L (ref 1.12–1.32)
CA-I BLDA-SCNC: 1.22 MMOL/L (ref 1.12–1.32)
CALCIUM BLD-MCNC: 8 MG/DL (ref 8.7–10.4)
CHLORIDE SERPL-SCNC: 112 MMOL/L (ref 98–112)
CO2 BLD-SCNC: 18 MMOL/L (ref 22–32)
CO2 BLDA-SCNC: 25 MMOL/L (ref 22–32)
CO2 BLDA-SCNC: 26 MMOL/L (ref 22–32)
CO2 BLDA-SCNC: 27 MMOL/L (ref 22–32)
CO2 SERPL-SCNC: 20 MMOL/L (ref 21–32)
COHGB MFR BLD: 1.5 % SAT (ref 0–3)
COHGB MFR BLD: 1.6 % SAT (ref 0–3)
CREAT BLD-MCNC: 1.06 MG/DL
EGFRCR SERPLBLD CKD-EPI 2021: 88 ML/MIN/1.73M2 (ref 60–?)
ERYTHROCYTE [DISTWIDTH] IN BLOOD BY AUTOMATED COUNT: 13.1 %
FIBRINOGEN PPP-MCNC: 381 MG/DL (ref 200–480)
FIBRINOGEN PPP-MCNC: 420 MG/DL (ref 200–480)
FIO2: 40 %
FIO2: 50 %
GLUCOSE BLD-MCNC: 114 MG/DL (ref 70–99)
GLUCOSE BLD-MCNC: 120 MG/DL (ref 70–99)
GLUCOSE BLD-MCNC: 129 MG/DL (ref 70–99)
GLUCOSE BLD-MCNC: 149 MG/DL (ref 70–99)
GLUCOSE BLD-MCNC: 184 MG/DL (ref 70–99)
GLUCOSE BLD-MCNC: 185 MG/DL (ref 70–99)
GLUCOSE BLD-MCNC: 188 MG/DL (ref 70–99)
GLUCOSE BLD-MCNC: 203 MG/DL (ref 70–99)
GLUCOSE BLD-MCNC: 211 MG/DL (ref 70–99)
GLUCOSE BLD-MCNC: 221 MG/DL (ref 70–99)
GLUCOSE BLD-MCNC: 222 MG/DL (ref 70–99)
GLUCOSE BLD-MCNC: 93 MG/DL (ref 70–99)
GLUCOSE BLD-MCNC: 93 MG/DL (ref 70–99)
GLUCOSE BLDA-MCNC: 144 MG/DL (ref 70–99)
GLUCOSE BLDA-MCNC: 173 MG/DL (ref 70–99)
GLUCOSE BLDA-MCNC: 177 MG/DL (ref 70–99)
HCO3 BLD-SCNC: 17.3 MEQ/L
HCO3 BLDA-SCNC: 19.8 MEQ/L (ref 21–27)
HCO3 BLDA-SCNC: 21.7 MEQ/L (ref 21–27)
HCO3 BLDA-SCNC: 24.2 MEQ/L (ref 22–26)
HCO3 BLDA-SCNC: 24.9 MEQ/L (ref 22–26)
HCO3 BLDA-SCNC: 25.5 MEQ/L (ref 22–26)
HCT VFR BLD AUTO: 34.1 %
HCT VFR BLD CALC: 32 %
HCT VFR BLDA CALC: 32 %
HCT VFR BLDA CALC: 33 %
HCT VFR BLDA CALC: 33 %
HGB BLD-MCNC: 11.2 G/DL
HGB BLD-MCNC: 11.6 G/DL
HGB BLD-MCNC: 11.9 G/DL
INR BLD: 1.56 (ref 0.8–1.2)
INR BLD: 1.65 (ref 0.8–1.2)
ISTAT ACTIVATED CLOTTING TIME: 153 SECONDS (ref 74–137)
ISTAT ACTIVATED CLOTTING TIME: 458 SECONDS (ref 74–137)
ISTAT BLOOD GAS FIO2: 50 %
ISTAT PATIENT TEMPERATURE: 32 DEGREE
ISTAT PATIENT TEMPERATURE: 32 DEGREE
ISTAT PATIENT TEMPERATURE: 37 DEGREE
ISTAT PATIENT TEMPERATURE: 96.3 DEGREE
LACTATE BLD-SCNC: 10.3 MMOL/L (ref 0.5–2)
LACTATE BLD-SCNC: 7.8 MMOL/L (ref 0.5–2)
MAGNESIUM SERPL-MCNC: 2 MG/DL (ref 1.6–2.6)
MCH RBC QN AUTO: 28.4 PG (ref 26–34)
MCHC RBC AUTO-ENTMCNC: 32.8 G/DL (ref 31–37)
MCV RBC AUTO: 86.3 FL
METHGB MFR BLD: 0.4 % SAT (ref 0.4–1.5)
METHGB MFR BLD: 0.8 % SAT (ref 0.4–1.5)
OXYHGB MFR BLDA: 96.7 % (ref 92–100)
OXYHGB MFR BLDA: 96.8 % (ref 92–100)
P AXIS: 73 DEGREES
P-R INTERVAL: 176 MS
PCO2 BLD: 37.5 MMHG
PCO2 BLDA: 33.8 MMHG (ref 35–45)
PCO2 BLDA: 34.1 MMHG (ref 35–45)
PCO2 BLDA: 37 MM HG (ref 35–45)
PCO2 BLDA: 38 MM HG (ref 35–45)
PCO2 BLDA: 43.3 MMHG (ref 35–45)
PEEP: 5 CM H2O
PEEP: 5 CM H2O
PH BLD: 7.27 [PH]
PH BLDA: 7.31 [PH] (ref 7.35–7.45)
PH BLDA: 7.36 [PH] (ref 7.35–7.45)
PH BLDA: 7.36 [PH] (ref 7.35–7.45)
PH BLDA: 7.45 [PH] (ref 7.35–7.45)
PH BLDA: 7.47 [PH] (ref 7.35–7.45)
PLATELET # BLD AUTO: 148 10(3)UL (ref 150–450)
PLATELET # BLD AUTO: 153 10(3)UL (ref 150–450)
PO2 BLD: 113 MMHG
PO2 BLDA: 343 MMHG (ref 80–105)
PO2 BLDA: 386 MMHG (ref 80–105)
PO2 BLDA: 395 MMHG (ref 80–105)
PO2 BLDA: 94 MM HG (ref 80–100)
PO2 BLDA: 96 MM HG (ref 80–100)
POTASSIUM BLD-SCNC: 3.1 MMOL/L (ref 3.6–5.1)
POTASSIUM BLD-SCNC: 4.1 MMOL/L (ref 3.6–5.1)
POTASSIUM BLD-SCNC: 4.3 MMOL/L (ref 3.6–5.1)
POTASSIUM SERPL-SCNC: 3.2 MMOL/L (ref 3.5–5.1)
PROTHROMBIN TIME: 18.8 SECONDS (ref 11.6–14.8)
PROTHROMBIN TIME: 19.7 SECONDS (ref 11.6–14.8)
Q-T INTERVAL: 546 MS
QRS DURATION: 182 MS
QTC CALCULATION (BEZET): 558 MS
R AXIS: 103 DEGREES
RBC # BLD AUTO: 3.95 X10(6)UL
SAO2 % BLD: 98 %
SAO2 % BLDA: 100 % (ref 92–100)
SODIUM BLD-SCNC: 142 MMOL/L (ref 135–145)
SODIUM BLD-SCNC: 142 MMOL/L (ref 135–145)
SODIUM BLD-SCNC: 146 MMOL/L (ref 136–145)
SODIUM BLDA-SCNC: 137 MMOL/L (ref 136–145)
SODIUM BLDA-SCNC: 138 MMOL/L (ref 136–145)
SODIUM BLDA-SCNC: 142 MMOL/L (ref 136–145)
SODIUM BLDA-SCNC: 3.4 MMOL/L (ref 3.6–5.1)
SODIUM BLDA-SCNC: 5.9 MMOL/L (ref 3.6–5.1)
SODIUM BLDA-SCNC: 6.5 MMOL/L (ref 3.6–5.1)
SODIUM SERPL-SCNC: 148 MMOL/L (ref 136–145)
T AXIS: 43 DEGREES
TIDAL VOLUME: 550 ML
TIDAL VOLUME: 550 ML
TROPONIN I SERPL HS-MCNC: 3719 NG/L
VENT RATE: 16 /MIN
VENT RATE: 16 /MIN
VENTRICULAR RATE: 63 BPM
WBC # BLD AUTO: 17.6 X10(3) UL (ref 4–11)

## 2025-01-13 PROCEDURE — 02B70ZK EXCISION OF LEFT ATRIAL APPENDAGE, OPEN APPROACH: ICD-10-PCS | Performed by: SURGERY

## 2025-01-13 PROCEDURE — B24BZZ4 ULTRASONOGRAPHY OF HEART WITH AORTA, TRANSESOPHAGEAL: ICD-10-PCS | Performed by: ANESTHESIOLOGY

## 2025-01-13 PROCEDURE — P9045 ALBUMIN (HUMAN), 5%, 250 ML: HCPCS | Performed by: ANESTHESIOLOGY

## 2025-01-13 PROCEDURE — 02100Z9 BYPASS CORONARY ARTERY, ONE ARTERY FROM LEFT INTERNAL MAMMARY, OPEN APPROACH: ICD-10-PCS | Performed by: SURGERY

## 2025-01-13 PROCEDURE — 71045 X-RAY EXAM CHEST 1 VIEW: CPT | Performed by: PHYSICIAN ASSISTANT

## 2025-01-13 PROCEDURE — 5A1221Z PERFORMANCE OF CARDIAC OUTPUT, CONTINUOUS: ICD-10-PCS | Performed by: SURGERY

## 2025-01-13 PROCEDURE — 30233R1 TRANSFUSION OF NONAUTOLOGOUS PLATELETS INTO PERIPHERAL VEIN, PERCUTANEOUS APPROACH: ICD-10-PCS | Performed by: SURGERY

## 2025-01-13 PROCEDURE — 36430 TRANSFUSION BLD/BLD COMPNT: CPT | Performed by: ANESTHESIOLOGY

## 2025-01-13 PROCEDURE — 021109W BYPASS CORONARY ARTERY, TWO ARTERIES FROM AORTA WITH AUTOLOGOUS VENOUS TISSUE, OPEN APPROACH: ICD-10-PCS | Performed by: SURGERY

## 2025-01-13 PROCEDURE — 06BQ4ZZ EXCISION OF LEFT SAPHENOUS VEIN, PERCUTANEOUS ENDOSCOPIC APPROACH: ICD-10-PCS | Performed by: SURGERY

## 2025-01-13 PROCEDURE — S0028 INJECTION, FAMOTIDINE, 20 MG: HCPCS | Performed by: ANESTHESIOLOGY

## 2025-01-13 PROCEDURE — 30233K1 TRANSFUSION OF NONAUTOLOGOUS FROZEN PLASMA INTO PERIPHERAL VEIN, PERCUTANEOUS APPROACH: ICD-10-PCS | Performed by: SURGERY

## 2025-01-13 PROCEDURE — 30233N0 TRANSFUSION OF AUTOLOGOUS RED BLOOD CELLS INTO PERIPHERAL VEIN, PERCUTANEOUS APPROACH: ICD-10-PCS | Performed by: SURGERY

## 2025-01-13 PROCEDURE — 93312 ECHO TRANSESOPHAGEAL: CPT | Performed by: ANESTHESIOLOGY

## 2025-01-13 PROCEDURE — 99232 SBSQ HOSP IP/OBS MODERATE 35: CPT | Performed by: HOSPITALIST

## 2025-01-13 DEVICE — LOCKING SCREW,AXS,SELF-DRILLING
Type: IMPLANTABLE DEVICE | Status: FUNCTIONAL
Brand: AXS, SMARTLOCK

## 2025-01-13 DEVICE — STERNALPLATE, X: Type: IMPLANTABLE DEVICE | Status: FUNCTIONAL

## 2025-01-13 RX ORDER — HEPARIN SODIUM 1000 [USP'U]/ML
INJECTION, SOLUTION INTRAVENOUS; SUBCUTANEOUS AS NEEDED
Status: DISCONTINUED | OUTPATIENT
Start: 2025-01-13 | End: 2025-01-13 | Stop reason: SURG

## 2025-01-13 RX ORDER — MIDAZOLAM HYDROCHLORIDE 1 MG/ML
1 INJECTION INTRAMUSCULAR; INTRAVENOUS EVERY 30 MIN PRN
Status: DISCONTINUED | OUTPATIENT
Start: 2025-01-13 | End: 2025-01-14

## 2025-01-13 RX ORDER — NICOTINE POLACRILEX 4 MG
15 LOZENGE BUCCAL
Status: DISCONTINUED | OUTPATIENT
Start: 2025-01-13 | End: 2025-01-17

## 2025-01-13 RX ORDER — DEXMEDETOMIDINE HYDROCHLORIDE 4 UG/ML
INJECTION, SOLUTION INTRAVENOUS CONTINUOUS
Status: DISCONTINUED | OUTPATIENT
Start: 2025-01-13 | End: 2025-01-14

## 2025-01-13 RX ORDER — MAGNESIUM SULFATE HEPTAHYDRATE 40 MG/ML
2 INJECTION, SOLUTION INTRAVENOUS AS NEEDED
Status: DISCONTINUED | OUTPATIENT
Start: 2025-01-13 | End: 2025-01-14

## 2025-01-13 RX ORDER — MIDAZOLAM HYDROCHLORIDE 1 MG/ML
INJECTION INTRAMUSCULAR; INTRAVENOUS AS NEEDED
Status: DISCONTINUED | OUTPATIENT
Start: 2025-01-13 | End: 2025-01-13 | Stop reason: SURG

## 2025-01-13 RX ORDER — ALBUMIN HUMAN 50 G/1000ML
SOLUTION INTRAVENOUS CONTINUOUS PRN
Status: DISCONTINUED | OUTPATIENT
Start: 2025-01-13 | End: 2025-01-13 | Stop reason: SURG

## 2025-01-13 RX ORDER — DIPHENHYDRAMINE HYDROCHLORIDE 50 MG/ML
INJECTION INTRAMUSCULAR; INTRAVENOUS AS NEEDED
Status: DISCONTINUED | OUTPATIENT
Start: 2025-01-13 | End: 2025-01-13 | Stop reason: SURG

## 2025-01-13 RX ORDER — ROCURONIUM BROMIDE 10 MG/ML
INJECTION, SOLUTION INTRAVENOUS AS NEEDED
Status: DISCONTINUED | OUTPATIENT
Start: 2025-01-13 | End: 2025-01-13 | Stop reason: SURG

## 2025-01-13 RX ORDER — ASPIRIN 81 MG/1
81 TABLET, CHEWABLE ORAL DAILY
Status: DISCONTINUED | OUTPATIENT
Start: 2025-01-14 | End: 2025-01-17

## 2025-01-13 RX ORDER — DEXMEDETOMIDINE HYDROCHLORIDE 4 UG/ML
INJECTION, SOLUTION INTRAVENOUS CONTINUOUS PRN
Status: DISCONTINUED | OUTPATIENT
Start: 2025-01-13 | End: 2025-01-13 | Stop reason: SURG

## 2025-01-13 RX ORDER — SODIUM CHLORIDE 9 MG/ML
INJECTION, SOLUTION INTRAVENOUS CONTINUOUS
Status: DISCONTINUED | OUTPATIENT
Start: 2025-01-13 | End: 2025-01-14

## 2025-01-13 RX ORDER — SENNOSIDES 8.6 MG
8.6 TABLET ORAL 2 TIMES DAILY
Status: DISCONTINUED | OUTPATIENT
Start: 2025-01-14 | End: 2025-01-17

## 2025-01-13 RX ORDER — DEXTROSE MONOHYDRATE AND SODIUM CHLORIDE 5; .45 G/100ML; G/100ML
INJECTION, SOLUTION INTRAVENOUS CONTINUOUS
Status: DISCONTINUED | OUTPATIENT
Start: 2025-01-13 | End: 2025-01-14

## 2025-01-13 RX ORDER — NALOXONE HYDROCHLORIDE 0.4 MG/ML
0.08 INJECTION, SOLUTION INTRAMUSCULAR; INTRAVENOUS; SUBCUTANEOUS
Status: DISCONTINUED | OUTPATIENT
Start: 2025-01-13 | End: 2025-01-14

## 2025-01-13 RX ORDER — POTASSIUM CHLORIDE 29.8 MG/ML
40 INJECTION INTRAVENOUS AS NEEDED
Status: DISCONTINUED | OUTPATIENT
Start: 2025-01-13 | End: 2025-01-15

## 2025-01-13 RX ORDER — MAGNESIUM SULFATE 1 G/100ML
1 INJECTION INTRAVENOUS AS NEEDED
Status: DISCONTINUED | OUTPATIENT
Start: 2025-01-13 | End: 2025-01-14

## 2025-01-13 RX ORDER — IPRATROPIUM BROMIDE AND ALBUTEROL SULFATE 2.5; .5 MG/3ML; MG/3ML
3 SOLUTION RESPIRATORY (INHALATION) EVERY 4 HOURS PRN
Status: DISCONTINUED | OUTPATIENT
Start: 2025-01-13 | End: 2025-01-17

## 2025-01-13 RX ORDER — PHENYLEPHRINE HCL 10 MG/ML
VIAL (ML) INJECTION AS NEEDED
Status: DISCONTINUED | OUTPATIENT
Start: 2025-01-13 | End: 2025-01-13 | Stop reason: SURG

## 2025-01-13 RX ORDER — ALBUMIN HUMAN 50 G/1000ML
12.5 SOLUTION INTRAVENOUS ONCE AS NEEDED
Status: ACTIVE | OUTPATIENT
Start: 2025-01-13 | End: 2025-01-14

## 2025-01-13 RX ORDER — ONDANSETRON 2 MG/ML
4 INJECTION INTRAMUSCULAR; INTRAVENOUS EVERY 6 HOURS PRN
Status: DISCONTINUED | OUTPATIENT
Start: 2025-01-13 | End: 2025-01-17

## 2025-01-13 RX ORDER — ACETAMINOPHEN 10 MG/ML
1000 INJECTION, SOLUTION INTRAVENOUS EVERY 6 HOURS
Status: COMPLETED | OUTPATIENT
Start: 2025-01-13 | End: 2025-01-14

## 2025-01-13 RX ORDER — PANTOPRAZOLE SODIUM 40 MG/1
40 TABLET, DELAYED RELEASE ORAL
Status: DISCONTINUED | OUTPATIENT
Start: 2025-01-14 | End: 2025-01-17

## 2025-01-13 RX ORDER — METHYLPREDNISOLONE SODIUM SUCCINATE 500 MG/8ML
INJECTION INTRAMUSCULAR; INTRAVENOUS AS NEEDED
Status: DISCONTINUED | OUTPATIENT
Start: 2025-01-13 | End: 2025-01-13 | Stop reason: SURG

## 2025-01-13 RX ORDER — POLYETHYLENE GLYCOL 3350 17 G/17G
17 POWDER, FOR SOLUTION ORAL DAILY PRN
Status: DISCONTINUED | OUTPATIENT
Start: 2025-01-13 | End: 2025-01-17

## 2025-01-13 RX ORDER — DOBUTAMINE HYDROCHLORIDE 200 MG/100ML
INJECTION INTRAVENOUS CONTINUOUS PRN
Status: DISCONTINUED | OUTPATIENT
Start: 2025-01-13 | End: 2025-01-13 | Stop reason: SURG

## 2025-01-13 RX ORDER — NITROGLYCERIN 20 MG/100ML
INJECTION INTRAVENOUS CONTINUOUS PRN
Status: DISCONTINUED | OUTPATIENT
Start: 2025-01-13 | End: 2025-01-14

## 2025-01-13 RX ORDER — BISACODYL 10 MG
10 SUPPOSITORY, RECTAL RECTAL
Status: DISCONTINUED | OUTPATIENT
Start: 2025-01-13 | End: 2025-01-17

## 2025-01-13 RX ORDER — PROTAMINE SULFATE 10 MG/ML
INJECTION, SOLUTION INTRAVENOUS AS NEEDED
Status: DISCONTINUED | OUTPATIENT
Start: 2025-01-13 | End: 2025-01-13 | Stop reason: SURG

## 2025-01-13 RX ORDER — CHLORHEXIDINE GLUCONATE ORAL RINSE 1.2 MG/ML
15 SOLUTION DENTAL
Status: DISCONTINUED | OUTPATIENT
Start: 2025-01-13 | End: 2025-01-13

## 2025-01-13 RX ORDER — DEXTROSE MONOHYDRATE 25 G/50ML
50 INJECTION, SOLUTION INTRAVENOUS
Status: DISCONTINUED | OUTPATIENT
Start: 2025-01-13 | End: 2025-01-17

## 2025-01-13 RX ORDER — DOCUSATE SODIUM 100 MG/1
100 CAPSULE, LIQUID FILLED ORAL 2 TIMES DAILY
Status: DISCONTINUED | OUTPATIENT
Start: 2025-01-14 | End: 2025-01-17

## 2025-01-13 RX ORDER — FAMOTIDINE 10 MG/ML
INJECTION, SOLUTION INTRAVENOUS AS NEEDED
Status: DISCONTINUED | OUTPATIENT
Start: 2025-01-13 | End: 2025-01-13 | Stop reason: SURG

## 2025-01-13 RX ORDER — VANCOMYCIN HYDROCHLORIDE
15 EVERY 12 HOURS
Status: COMPLETED | OUTPATIENT
Start: 2025-01-13 | End: 2025-01-14

## 2025-01-13 RX ORDER — DOBUTAMINE HYDROCHLORIDE 200 MG/100ML
INJECTION INTRAVENOUS CONTINUOUS PRN
Status: DISCONTINUED | OUTPATIENT
Start: 2025-01-13 | End: 2025-01-15

## 2025-01-13 RX ORDER — NICOTINE POLACRILEX 4 MG
30 LOZENGE BUCCAL
Status: DISCONTINUED | OUTPATIENT
Start: 2025-01-13 | End: 2025-01-17

## 2025-01-13 RX ORDER — ONDANSETRON 2 MG/ML
4 INJECTION INTRAMUSCULAR; INTRAVENOUS EVERY 6 HOURS PRN
Status: DISCONTINUED | OUTPATIENT
Start: 2025-01-13 | End: 2025-01-14

## 2025-01-13 RX ORDER — POTASSIUM CHLORIDE 14.9 MG/ML
20 INJECTION INTRAVENOUS AS NEEDED
Status: DISCONTINUED | OUTPATIENT
Start: 2025-01-13 | End: 2025-01-15

## 2025-01-13 RX ORDER — DIPHENHYDRAMINE HYDROCHLORIDE 50 MG/ML
12.5 INJECTION INTRAMUSCULAR; INTRAVENOUS EVERY 4 HOURS PRN
Status: DISCONTINUED | OUTPATIENT
Start: 2025-01-13 | End: 2025-01-14

## 2025-01-13 RX ORDER — METOCLOPRAMIDE HYDROCHLORIDE 5 MG/ML
INJECTION INTRAMUSCULAR; INTRAVENOUS AS NEEDED
Status: DISCONTINUED | OUTPATIENT
Start: 2025-01-13 | End: 2025-01-13 | Stop reason: SURG

## 2025-01-13 RX ADMIN — PROTAMINE SULFATE 50 MG: 10 INJECTION, SOLUTION INTRAVENOUS at 11:49:00

## 2025-01-13 RX ADMIN — DOBUTAMINE HYDROCHLORIDE 5 MCG/KG/MIN: 200 INJECTION INTRAVENOUS at 11:32:00

## 2025-01-13 RX ADMIN — ALBUMIN HUMAN: 50 SOLUTION INTRAVENOUS at 13:02:00

## 2025-01-13 RX ADMIN — ALBUMIN HUMAN: 50 SOLUTION INTRAVENOUS at 12:53:00

## 2025-01-13 RX ADMIN — ROCURONIUM BROMIDE 100 MG: 10 INJECTION, SOLUTION INTRAVENOUS at 06:55:00

## 2025-01-13 RX ADMIN — HEPARIN SODIUM 5000 UNITS: 1000 INJECTION, SOLUTION INTRAVENOUS; SUBCUTANEOUS at 08:41:00

## 2025-01-13 RX ADMIN — PROTAMINE SULFATE 50 MG: 10 INJECTION, SOLUTION INTRAVENOUS at 11:47:00

## 2025-01-13 RX ADMIN — PROTAMINE SULFATE 50 MG: 10 INJECTION, SOLUTION INTRAVENOUS at 11:45:00

## 2025-01-13 RX ADMIN — DIPHENHYDRAMINE HYDROCHLORIDE 50 MG: 50 INJECTION INTRAMUSCULAR; INTRAVENOUS at 06:49:00

## 2025-01-13 RX ADMIN — FAMOTIDINE 20 MG: 10 INJECTION, SOLUTION INTRAVENOUS at 06:49:00

## 2025-01-13 RX ADMIN — PHENYLEPHRINE HCL 100 MCG: 10 MG/ML VIAL (ML) INJECTION at 09:11:00

## 2025-01-13 RX ADMIN — PHENYLEPHRINE HCL 200 MCG: 10 MG/ML VIAL (ML) INJECTION at 09:06:00

## 2025-01-13 RX ADMIN — PROTAMINE SULFATE 50 MG: 10 INJECTION, SOLUTION INTRAVENOUS at 11:46:00

## 2025-01-13 RX ADMIN — PROTAMINE SULFATE 50 MG: 10 INJECTION, SOLUTION INTRAVENOUS at 11:51:00

## 2025-01-13 RX ADMIN — METOCLOPRAMIDE HYDROCHLORIDE 10 MG: 5 INJECTION INTRAMUSCULAR; INTRAVENOUS at 11:49:00

## 2025-01-13 RX ADMIN — METHYLPREDNISOLONE SODIUM SUCCINATE 500 MG: 500 INJECTION INTRAMUSCULAR; INTRAVENOUS at 07:14:00

## 2025-01-13 RX ADMIN — MIDAZOLAM HYDROCHLORIDE 4 MG: 1 INJECTION INTRAMUSCULAR; INTRAVENOUS at 06:55:00

## 2025-01-13 RX ADMIN — ROCURONIUM BROMIDE 100 MG: 10 INJECTION, SOLUTION INTRAVENOUS at 09:05:00

## 2025-01-13 RX ADMIN — HEPARIN SODIUM 22000 UNITS: 1000 INJECTION, SOLUTION INTRAVENOUS; SUBCUTANEOUS at 09:00:00

## 2025-01-13 RX ADMIN — SODIUM CHLORIDE: 9 INJECTION, SOLUTION INTRAVENOUS at 06:49:00

## 2025-01-13 RX ADMIN — MIDAZOLAM HYDROCHLORIDE 4 MG: 1 INJECTION INTRAMUSCULAR; INTRAVENOUS at 09:05:00

## 2025-01-13 RX ADMIN — PHENYLEPHRINE HCL 100 MCG: 10 MG/ML VIAL (ML) INJECTION at 09:20:00

## 2025-01-13 RX ADMIN — PROTAMINE SULFATE 50 MG: 10 INJECTION, SOLUTION INTRAVENOUS at 11:58:00

## 2025-01-13 RX ADMIN — PROTAMINE SULFATE 50 MG: 10 INJECTION, SOLUTION INTRAVENOUS at 11:52:00

## 2025-01-13 RX ADMIN — ALBUMIN HUMAN: 50 SOLUTION INTRAVENOUS at 12:46:00

## 2025-01-13 RX ADMIN — PROTAMINE SULFATE 50 MG: 10 INJECTION, SOLUTION INTRAVENOUS at 11:53:00

## 2025-01-13 RX ADMIN — PROTAMINE SULFATE 50 MG: 10 INJECTION, SOLUTION INTRAVENOUS at 11:48:00

## 2025-01-13 RX ADMIN — SODIUM CHLORIDE: 9 INJECTION, SOLUTION INTRAVENOUS at 14:10:00

## 2025-01-13 RX ADMIN — ROCURONIUM BROMIDE 50 MG: 10 INJECTION, SOLUTION INTRAVENOUS at 10:55:00

## 2025-01-13 RX ADMIN — DEXMEDETOMIDINE HYDROCHLORIDE 0.5 MCG/KG/HR: 4 INJECTION, SOLUTION INTRAVENOUS at 07:19:00

## 2025-01-13 RX ADMIN — MIDAZOLAM HYDROCHLORIDE 2 MG: 1 INJECTION INTRAMUSCULAR; INTRAVENOUS at 10:55:00

## 2025-01-13 RX ADMIN — PROTAMINE SULFATE 50 MG: 10 INJECTION, SOLUTION INTRAVENOUS at 12:00:00

## 2025-01-13 RX ADMIN — DOBUTAMINE HYDROCHLORIDE 2 MCG/KG/MIN: 200 INJECTION INTRAVENOUS at 11:41:00

## 2025-01-13 NOTE — ANESTHESIA PREPROCEDURE EVALUATION
PRE-OP EVALUATION    Patient Name: Wander Castro    Admit Diagnosis: NSTEMI (non-ST elevated myocardial infarction) ([facility]) [I21.4]    Pre-op Diagnosis: coronary artery disease    CORONARY ARTERY BYPASS GRAFT WITH PLATES    Anesthesia Procedure: CORONARY ARTERY BYPASS GRAFT WITH PLATES    Surgeons and Role:     * Ronal Cooper MD - Primary    Pre-op vitals reviewed.  Temp: 98.3 °F (36.8 °C)  Pulse: 85  Resp: 24  BP: 126/96  SpO2: 97 %  Body mass index is 26.82 kg/m².    Current medications reviewed.  Hospital Medications:   [] melatonin tab 3 mg  3 mg Oral Once PRN    [COMPLETED] mupirocin (Bactroban) 2% nasal ointment 1 Application  1 Application Nasal Now then every 0500    [COMPLETED] metoprolol tartrate (Lopressor) partial tab 12.5 mg  12.5 mg Oral Once    [COMPLETED] iopamidol 76% (ISOVUE-370) injection for power injector  100 mL Intravenous ONCE PRN    [COMPLETED] aspirin 81 MG chewable tab        nitroGLYCERIN in dextrose 5% 50 mg/250mL infusion premix  5-400 mcg/min Intravenous Continuous    [COMPLETED] heparin (Porcine) 1000 UNIT/ML injection - BOLUS IV 5,000 Units  5,000 Units Intravenous Once    [COMPLETED] heparin (Porcine) 98656 units/250mL infusion ED INITIAL DOSE  12 Units/kg/hr Intravenous Once    heparin (Porcine) 74358 units/250mL infusion ACS/AFIB CONTINUOUS  200-3,000 Units/hr Intravenous Continuous    acetaminophen (Tylenol Extra Strength) tab 500 mg  500 mg Oral Q4H PRN    polyethylene glycol (PEG 3350) (Miralax) 17 g oral packet 17 g  17 g Oral Daily PRN    sennosides (Senokot) tab 17.2 mg  17.2 mg Oral Nightly PRN    bisacodyl (Dulcolax) 10 MG rectal suppository 10 mg  10 mg Rectal Daily PRN    fleet enema (Fleet) rectal enema 133 mL  1 enema Rectal Once PRN    melatonin tab 3 mg  3 mg Oral Nightly PRN    ondansetron (Zofran) 4 MG/2ML injection 4 mg  4 mg Intravenous Q6H PRN    prochlorperazine (Compazine) 10 MG/2ML injection 5 mg  5 mg Intravenous Q8H PRN    benzonatate (Tessalon) cap  200 mg  200 mg Oral TID PRN    glycerin-hypromellose- (Artificial Tears) 0.2-0.2-1 % ophthalmic solution 1 drop  1 drop Both Eyes QID PRN    sodium chloride (Saline Mist) 0.65 % nasal solution 1 spray  1 spray Each Nare Q3H PRN    [Held by provider] aspirin chewable tab 81 mg  81 mg Oral Daily    sodium chloride 0.9% infusion   Intravenous Continuous    [COMPLETED] iopamidol 76% (ISOVUE-370) injection for power injector  100 mL Intravenous ONCE PRN    [COMPLETED] lidocaine PF (Xylocaine-MPF) 1 % injection        [COMPLETED] heparin (Porcine) 5000 UNIT/ML injection        [COMPLETED] verapamil (Isoptin) 2.5 mg/mL injection        [COMPLETED] Nitroglycerin in D5W 200-5 MCG/ML-% injection        [COMPLETED] iopamidol (ISOVUE-370) 76 % injection 100 mL  100 mL Injection ONCE PRN    [COMPLETED] fentaNYL (Sublimaze) 50 mcg/mL injection        [COMPLETED] midazolam (Versed) 2 MG/2ML injection        [COMPLETED] heparin (Porcine) 5000 UNIT/ML injection        rosuvastatin (Crestor) tab 20 mg  20 mg Oral Nightly       Outpatient Medications:   Prescriptions Prior to Admission[1]    Allergies: Patient has no known allergies.      Anesthesia Evaluation    Patient summary reviewed.    Anesthetic Complications  (-) history of anesthetic complications         GI/Hepatic/Renal    Negative GI/hepatic/renal ROS.                             Cardiovascular  Comment: Conclusions:     1. Left ventricle: The cavity size was normal. Wall thickness was normal.      Systolic function was normal. The estimated ejection fraction was 60-65%,      by visual assessment. No diagnostic evidence for regional wall motion      abnormalities. Doppler parameters are consistent with abnormal left      ventricular relaxation - grade 1 diastolic dysfunction.   2. Right ventricle: The cavity size was normal. Systolic function was      normal.   3. Left atrium: The left atrial volume was normal.   4. Aortic root: The aortic root was 4.0cm diameter.    5. Pericardium, extracardiac: There was no pericardial effusion.   Impressions:  No previous study from [facility] was   available for comparison.   *       ECG reviewed.            (+) hypertension   (+) hyperlipidemia  (+) CAD  (+) past MI                (+) angina              Endo/Other                                  Pulmonary    Negative pulmonary ROS.                       Neuro/Psych                                      History reviewed. No pertinent surgical history.  Social History     Socioeconomic History    Marital status:    Tobacco Use    Smoking status: Never    Smokeless tobacco: Never   Substance and Sexual Activity    Alcohol use: Not Currently     Comment: socially    Drug use: Never     History   Drug Use Unknown     Available pre-op labs reviewed.  Lab Results   Component Value Date    WBC 8.1 01/12/2025    RBC 4.68 01/12/2025    HGB 13.2 01/12/2025    HCT 39.3 01/12/2025    MCV 84.0 01/12/2025    MCH 28.2 01/12/2025    MCHC 33.6 01/12/2025    RDW 13.2 01/12/2025    .0 01/12/2025    .0 01/12/2025     Lab Results   Component Value Date     01/12/2025    K 3.9 01/12/2025     01/12/2025    CO2 25.0 01/12/2025    BUN 12 01/12/2025    CREATSERUM 0.86 01/12/2025     (H) 01/12/2025    CA 9.4 01/12/2025     Lab Results   Component Value Date    INR 1.11 01/09/2025         Airway      Mallampati: II  Mouth opening: 3 FB  TM distance: 4 - 6 cm  Neck ROM: full Cardiovascular      Rhythm: regular  Rate: normal     Dental  Comment: No loose teeth reported by patient           Pulmonary      Breath sounds clear to auscultation bilaterally.               Other findings              ASA: 4   Plan: general  NPO status verified and patient meets guidelines.  Patient has taken beta blockers in last 24 hours.  Post-procedure pain management plan discussed with surgeon and patient.    Comment: We discussed GA w/ETT and postoperative ventilation and CCU  admission.  We discussed placement of additional lines including arterial catheter, additional PIVs, central venous catheter/PAC and intraop TERRELL.  Patient understands extubation will occur once the overall hemodynamic status is stable. Discussed rare risk of dental trauma from intubation, scratchy throat, and postop nausea and vomiting.  Discussed possible use of blood products. We discussed postoperative usage of sedatives, analgesics and anxiolytics.  All questions re: anesthetic management were answered.    Plan/risks discussed with: patient  Use of blood product(s) discussed with: patient              Present on Admission:   NSTEMI (non-ST elevated myocardial infarction) (HCC)   Primary hypertension   Pure hypercholesterolemia             [1]   Medications Prior to Admission   Medication Sig Dispense Refill Last Dose/Taking    amLODIPine 5 MG Oral Tab Take 1 tablet (5 mg total) by mouth daily.   1/8/2025 at  7:00 AM    atorvastatin 10 MG Oral Tab Take 1 tablet (10 mg total) by mouth daily.   1/8/2025 at  7:00 AM    Multiple Vitamins-Minerals (MULTI-VITAMIN/MINERALS) Oral Tab Take 1 tablet by mouth daily.   1/8/2025 at  7:00 AM    FISH OIL OR Take 1 capsule by mouth daily.   1/8/2025 at  7:00 AM    ZINC OR Take 1 tablet by mouth daily.   1/8/2025 at  7:00 AM    VITAMIN C 1000 MG Oral Tab Take 1 tablet (1,000 mg total) by mouth daily.   1/8/2025 at  7:00 AM

## 2025-01-13 NOTE — PROGRESS NOTES
Anesthesia Ventilator Management    Patient is s/p 3V CABG  POD#0.  Patient is currently sedated and intubated.  Vent settings: PRVC 550/14/50/5  ABG, CXR pending    Dex@ 0.5  Prop@25  Epi@ 2    Will wean FiO2 as tolerated.  Plan for extubation after CPAP trial.

## 2025-01-13 NOTE — PROGRESS NOTES
[facility]   [facility]     Wander Castro Patient Status:  Inpatient    1980 MRN [id number]   Location [facility] 6NE-A Attending Leslie Hernandes DO   Hosp Day # 4 PCP No primary care provider on file.     Chief Complaint: Chest pain    Subjective:     Patient seen examined at bedside.  No overnight events or new complaints.     Objective:    Review of Systems:   A comprehensive review of systems was completed; pertinent positive and negatives stated in subjective.    Vital signs:  Temp:  [98 °F (36.7 °C)-98.3 °F (36.8 °C)] 98.3 °F (36.8 °C)  Pulse:  [58-85] 85  Resp:  [8-24] 24  BP: (116-166)/(80-96) 144/92  SpO2:  [91 %-97 %] 97 %    Physical Exam:    General: No acute distress  Respiratory: No wheezes, no rhonchi  Cardiovascular: S1, S2, regular rate and rhythm  Abdomen: Soft, Non-tender, non-distended, positive bowel sounds  Neuro: No new focal deficits.   Extremities: No edema      Diagnostic Data:    Labs:  Recent Labs   Lab 25  0509 25  0818 01/10/25  0311 25  0559 25  0414 25  1204   WBC 8.0 9.6  --   --  8.1  --    HGB 15.4 14.8  --   --  13.2  --    MCV 84.4 82.6  --   --  84.0  --    .0 200.0 186.0 163.0 192.0  192.0 153.0   INR 1.11  --   --   --   --   --        Recent Labs   Lab 25  0509 25  0818 01/10/25  0944 25  0414   * 121* 114* 106*   BUN 14 13 11 12   CREATSERUM 1.00 0.89 0.87 0.86   CA 10.5* 10.2 9.2 9.4   ALB 4.4  --   --   --     139 142 142   K 3.8 4.2 3.6 3.9    105 108 110   CO2 28.0 24.0 27.0 25.0   ALKPHO 55  --   --   --    AST 18  --   --   --    ALT 23  --   --   --    BILT 0.8  --   --   --    TP 7.6  --   --   --        Estimated Glomerular Filtration Rate: 108.1 mL/min/1.73m2 (by CKD-EPI based on SCr of 0.86 mg/dL).    Recent Labs   Lab 25  0559 25  0414 25  0447   TROPHS 2,125* 1,343* 3,719*       Recent Labs   Lab 25  9117    PTP 14.4   INR 1.11          Microbiology    No results found for this visit on 01/09/25.      Imaging: Reviewed in Epic.    Medications:    [Held by provider] aspirin  81 mg Oral Daily    [Transfer Hold] rosuvastatin  20 mg Oral Nightly       Assessment & Plan:      #STEMI  -Multivessel disease on cath  -Plan is for three-vessel bypass today  -Cardiology and cardiothoracic surgery following    # Hypertension  -Blood pressure meds on hold with patient being on nitro drip.    # Hyperlipidemia  -Continue statin therapy.      Sancho Fairchild, DO    Supplementary Documentation:     Quality:  DVT Mechanical Prophylaxis:        DVT Pharmacologic Prophylaxis   Medication    heparin in lactated ringers ((Porcine)) 6500 UNITS - 1 L for CVOR procedural use    heparin (Porcine) 28529 units/250mL infusion ACS/AFIB CONTINUOUS     Facility-Administered Medications Ordered in Other Encounters (Includes Only Anticoagulants)   Medication    heparin (Porcine) 1000 UNIT/ML injection      DVT Pharmacologic prophylaxis: Aspirin 162 mg         Code Status: Not on file  Dunn: Dunn catheter in place  Dunn Duration (in days):   Central line:    RICHARD:     Discharge is dependent on: Clinical improvement  At this point Mr. Castro is expected to be discharge to: Home    The 21st Century Cures Act makes medical notes like these available to patients in the interest of transparency. Please be advised this is a medical document. Medical documents are intended to carry relevant information, facts as evident, and the clinical opinion of the practitioner. The medical note is intended as peer to peer communication and may appear blunt or direct. It is written in medical language and may contain abbreviations or verbiage that are unfamiliar.

## 2025-01-13 NOTE — ANESTHESIA POSTPROCEDURE EVALUATION
[facility] Patient Status:  Inpatient   Age/Gender 44 year old male MRN [id number]   Location [facility] 6NE-A Attending Sancho Fairchild*   Hosp Day # 4 PCP No primary care provider on file.       Anesthesia Post-op Note    CORONARY ARTERY BYPASS GRAFT x3, INTRAOPERATIVE TRANSESOPHAGEAL ECHOCARDIOGRAM, ENDOSCOPIC HARVEST OF LEFT SAPHENOUS VEIN, AMPUTATION LEFT ATRIAL APPENDAGE    Procedure Summary       Date: 01/13/25 Room / Location:  CVOR 02 /  CVOR    Anesthesia Start: 0649 Anesthesia Stop:     Procedure: CORONARY ARTERY BYPASS GRAFT x3, INTRAOPERATIVE TRANSESOPHAGEAL ECHOCARDIOGRAM, ENDOSCOPIC HARVEST OF LEFT SAPHENOUS VEIN, AMPUTATION LEFT ATRIAL APPENDAGE (Chest) Diagnosis: (SAME)    Surgeons: Ronal Cooper MD Anesthesiologist: Rufus Mars MD    Anesthesia Type: general ASA Status: 4            Anesthesia Type: general    Vitals Value Taken Time   /62 01/13/25 1411   Temp 97 01/13/25 1411   Pulse 63 01/13/25 1410   Resp 19 01/13/25 1410   SpO2 98 % 01/13/25 1410   Vitals shown include unfiled device data.        Patient Location: ICU    Anesthesia Type: general    Airway Patency: intubated    Postop Pain Control: Other: (Intubated and sedated)    Mental Status: Other: (Intubated and sedated)    Nausea/Vomiting: Other: (Intubated and sedated)    Cardiopulmonary/Hydration status: stable euvolemic    Complications: no apparent anesthesia related complications    Postop vital signs: stable    Dental Exam: Unchanged from Preop    Sign out given to ICU staff.

## 2025-01-13 NOTE — PLAN OF CARE
Assumed care of pt. At 1930. Pt. A & O x4. VSS. C/o mild HA, relieved w/ PRN medication. Heparin infusing per ACS protocol. Nitroglycerin infusing for chest pain. Ambulating. Shaved & cleansed in prep for CABG in AM. NPO at midnight. Will continue to monitor closely.     To OR @ 4949. Spouse at bedside.

## 2025-01-13 NOTE — PROGRESS NOTES
[facility] Cardiology  Progress Note    Wander Castro Patient Status:  Inpatient    1980 MRN [id number]   Location [facility] 6NE-A Attending Sancho Fairchild*   Hosp Day # 4 PCP No primary care provider on file.       Assessment and Plan:    CV - S/P CABG, POD#0, normal LV fxn preop; doing well; hemodynamics at goal; EKG without injury; supportive care; wean pressors and extubate  Lipids - statin  Left subclavian stenosis - distal to LIMA  L3    Subjective:  Intubated and sedated    Objective:       Intake/Output:    Intake/Output Summary (Last 24 hours) at 2025 1516  Last data filed at 2025 1410  Gross per 24 hour   Intake 5047 ml   Output 500 ml   Net 4547 ml       Wt Readings from Last 3 Encounters:   25 186 lb 14.4 oz (84.8 kg)       PAP: (22-46)/(12-20) 46/20  CO:  [6.1 L/min-7.8 L/min] 7.8 L/min  CI:  [3 L/min/m2-3.8 L/min/m2] 3.8 L/min/m2    Physical Exam:  Blood pressure 104/56, pulse 65, temperature (!) 96.4 °F (35.8 °C), temperature source Pulmonary Artery, resp. rate 22, height 5' 10\" (1.778 m), weight 186 lb 14.4 oz (84.8 kg), SpO2 98%.    General: In no apparent distress.  Cardiac: Regular rate and rhythm, +rub  Lungs: Clear   Abdomen: Soft, non-tender.   Extremities: Without clubbing, cyanosis or edema.  Neurologic: sedated  Skin: Warm and dry.     Laboratory/Data:    Labs:  No results for input(s): \"TROP\", \"CK\" in the last 168 hours.   Recent Labs   Lab 25  0509 25  0818 01/10/25  0311 25  0559 25  0414 25  1204   RBC 5.45 5.29  --   --  4.68  --    HGB 15.4 14.8  --   --  13.2  --    HCT 46.0 43.7  --   --  39.3  --    MCV 84.4 82.6  --   --  84.0  --    MCH 28.3 28.0  --   --  28.2  --    MCHC 33.5 33.9  --   --  33.6  --    RDW 13.2 13.1  --   --  13.2  --    NEPRELIM 3.90  --   --   --   --   --    WBC 8.0 9.6  --   --  8.1  --    .0 200.0   < > 163.0 192.0  192.0 153.0    < > = values in this interval not displayed.     Recent  Labs   Lab 01/09/25  0509 01/09/25  0818 01/10/25  0944 01/12/25  0414 01/13/25  1444   *   < > 114* 106* 188*   BUN 14   < > 11 12 13   CREATSERUM 1.00   < > 0.87 0.86 1.06   CA 10.5*   < > 9.2 9.4 8.0*   ALB 4.4  --   --   --   --       < > 142 142 148*   K 3.8   < > 3.6 3.9 3.2*      < > 108 110 112   CO2 28.0   < > 27.0 25.0 20.0*   ALKPHO 55  --   --   --   --    AST 18  --   --   --   --    ALT 23  --   --   --   --    BILT 0.8  --   --   --   --    TP 7.6  --   --   --   --     < > = values in this interval not displayed.      Lab Results   Component Value Date    INR 1.56 (H) 01/13/2025    INR 1.65 (H) 01/13/2025    INR 1.11 01/09/2025        Medications:    Infusions:      insulin regular      propofol 25 mcg/kg/min (01/13/25 1507)    dexmedetomidine 0.5 mcg/kg/hr (01/13/25 1502)    sodium chloride      DOBUTamine      nitroGLYCERIN in dextrose 5%      norepinephrine      NitroPRUSSide (Nipride) 50 mg in dextrose 5% 250 mL infusion      dextrose 5%-sodium chloride 0.45% 80 mL/hr (01/13/25 1454)    sodium chloride      HYDROmorphone in sodium chloride 0.9%      amiodarone 1 mg/min (01/13/25 1459)     Scheduled:      chlorhexidine gluconate  15 mL Mouth/Throat BID@0800,2000    [Held by provider] aspirin  81 mg Oral Daily    acetaminophen  1,000 mg Intravenous Q6H    [START ON 1/14/2025] sennosides  8.6 mg Oral BID    [START ON 1/14/2025] docusate sodium  100 mg Oral BID    vancomycin  15 mg/kg Intravenous Q12H    [START ON 1/14/2025] metoprolol tartrate  12.5 mg Oral 2x Daily(Beta Blocker)    mupirocin  1 Application Nasal BID    [START ON 1/14/2025] pantoprazole  40 mg Intravenous QAM AC    Or    [START ON 1/14/2025] pantoprazole  40 mg Oral QAM AC    ceFAZolin  2 g Intravenous Q8H    rosuvastatin  20 mg Oral Nightly           Garrett Bello MD

## 2025-01-13 NOTE — DIETARY NOTE
Clinical Nutrition     Dietitian consult received per cardiac rehab standing order. Pt to be educated by cardiac rehab staff and encouraged to attend outpatient classes taught by DANILO. DANILO available PRN.    Sonia Michaels, DANILO, LDN, [facility]  Clinical Dietitian  Spectra: [phone]

## 2025-01-13 NOTE — ANESTHESIA PROCEDURE NOTES
Arterial Line    Performed by: Rufus Mars MD  Authorized by: Rufus Mars MD    General Information and Staff    Procedure Start:   Anesthesiologist:  Rufus Mars MD  Performed By:  Anesthesiologist  Patient Location:  OR  Indication: continuous blood pressure monitoring and blood sampling needed    Site Identification: surface landmarks    Preanesthetic Checklist: 2 patient identifiers, IV checked, risks and benefits discussed, monitors and equipment checked, pre-op evaluation, timeout performed, anesthesia consent and sterile technique used    Procedure Details    Catheter Size:  20 G  Catheter Length:  1 and 3/4 inch  Catheter Type:  Arrow  Seldinger Technique?: Yes    Site:  Radial artery  Site Prep: chlorhexidine    Line Secured:  Tape and Tegaderm    Assessment    Events: patient tolerated procedure well with no complications      Medications      Additional Comments

## 2025-01-13 NOTE — PROGRESS NOTES
[facility] Cardiology Progress Note    Wander Castro Patient Status:  Inpatient    1980 MRN [id number]   Location [facility] 6NE-A Attending Sancho Fairchild*   Hosp Day # 8 PCP No primary care provider on file.     Subjective:  No acute events overnight  Up walking in halls, 3rd walk already this am  Did stairs ok  Pain controlled  C/o mild non-productive cough     Objective:  /76 (BP Location: Right arm)   Pulse 64   Temp 99 °F (37.2 °C) (Oral)   Resp 18   Ht 5' 10\" (1.778 m)   Wt 192 lb 0.3 oz (87.1 kg)   SpO2 98%   BMI 27.55 kg/m²     Telemetry: nsr, SB      Intake/Output:    Intake/Output Summary (Last 24 hours) at 2025 0740  Last data filed at 2025 0536  Gross per 24 hour   Intake 1320 ml   Output 4100 ml   Net -2780 ml       Last 3 Weights   25 0536 192 lb 0.3 oz (87.1 kg)   25 0550 197 lb (89.4 kg)   25 0600 197 lb 1.5 oz (89.4 kg)   25 0500 186 lb 14.4 oz (84.8 kg)   25 1551 190 lb 0.6 oz (86.2 kg)   25 0506 190 lb (86.2 kg)       Labs:  Recent Labs   Lab 25  0409 01/15/25  0522 25  0615 25  0450   * 127* 105*  --    BUN 14 20 21  --    CREATSERUM 0.94 0.94 0.84  --    EGFRCR 101 101 109  --    CA 8.8 9.0 9.2  --     142 141  --    K 4.2 3.9 3.8 3.9    108 106  --    CO2 26.0 28.0 28.0  --      Recent Labs   Lab 25  0409 01/15/25  0522 25  0615   RBC 3.92* 3.68* 3.74*   HGB 11.1* 10.3* 10.5*   HCT 33.2* 31.8* 32.2*   MCV 84.7 86.4 86.1   MCH 28.3 28.0 28.1   MCHC 33.4 32.4 32.6   RDW 13.6 13.8 13.7   NEPRELIM 11.10*  --   --    WBC 13.7* 12.2* 10.5   .0 147.0* 162.0         Recent Labs   Lab 25  0559 25  0414 25  0447   TROPHS 2,125* 1,343* 3,719*       Diagnostics:             Physical Exam:    Physical Exam  Cardiovascular:      Rate and Rhythm: Normal rate and regular rhythm.      Comments: Mediastinal incision open to air, well approximated, no  drainage      Pulmonary:      Effort: Pulmonary effort is normal.      Comments: Dim RLL  Abdominal:      Palpations: Abdomen is soft.   Musculoskeletal:      Right lower leg: No edema.      Left lower leg: No edema.   Skin:     General: Skin is warm and dry.   Neurological:      Mental Status: He is alert and oriented to person, place, and time.         Medications:   furosemide  40 mg Intravenous BID (Diuretic)    amiodarone  400 mg Oral Daily    insulin aspart  1-10 Units Subcutaneous TID AC and HS    metoprolol tartrate  12.5 mg Oral 2x Daily(Beta Blocker)    losartan  25 mg Oral Daily    aspirin  81 mg Oral Daily    sennosides  8.6 mg Oral BID    docusate sodium  100 mg Oral BID    mupirocin  1 Application Nasal BID    pantoprazole  40 mg Intravenous QAM AC    Or    pantoprazole  40 mg Oral QAM AC    rosuvastatin  20 mg Oral Nightly           Assessment:    NSTEMI   POD #4 s/p CABG x4 (LIMA-LAD, SVG-Diag,SVG-OM) & SAMARA amputation   EF 60-65%  Asa, bb, prophylactic amiodarone (no post op AF)  Post op blood loss anemia- stable and expected  Post op volume retention, improving but still up a 5L/several lbs  Mild, extrinsic Left Subclavian arteral  compression- distal to LIMA  ? Thoracic outlet syndrome Right subclavian    Asymptomatic, sister with TOS and prior DVT  HL  LDL only 108, fam history of premature cad, new on Crestor   HTN   Controlled     Plan:    Starting plavix for nstemi presentation   Continue oral diuretics on dc, wt still up a few  lbs,  Lasix 40mg dialy x 1 week  Amio 400mg daily x 1 week, then 200mgdaiy.  Has been loaded 2400mg thus far.   Change lopressor to metoprolol xl 25mg daily  Continue losartan 25mg daily  Will review plans for TOS with CV surgery.   Ok to discharge from cardiology perspective      Kary Larson, APRN  1/13/2025  2:52 PM  Ph [phone] ([place])  Ph [phone] ([place])      I have personally seen and examined patient.   I have independently formulated  assessment and plan  I agree with the AP note    Doing well. No complaints  PE:  AAO x 3  RRR, No murmurs  No edema    A/P:  CAD s/p CABG  Hypertension  Hyperlipidemia    Cont with ASA and plavix  Cont with statin and metoprolol  Ambulating with no complaints.   Cardiology service will sign off. Please call with questions    Thank you for the opportunity to participate in the care of your patient.     Sanchez Juarez MD  Interventional Cardiologist  [facility]

## 2025-01-13 NOTE — ANESTHESIA PROCEDURE NOTES
Airway  Date/Time: 1/13/2025 6:58 AM  Urgency: elective    Airway not difficult    General Information and Staff    Patient location during procedure: OR  Anesthesiologist: Rufus Mars MD  Performed: anesthesiologist   Performed by: Rufus Mars MD  Authorized by: Rufus Mars MD      Indications and Patient Condition  Indications for airway management: anesthesia  Sedation level: deep  Preoxygenated: yes  Patient position: sniffing  Mask difficulty assessment: 1 - vent by mask    Final Airway Details  Final airway type: endotracheal airway      Successful airway: ETT  Cuffed: yes   Successful intubation technique: direct laryngoscopy  Endotracheal tube insertion site: oral  Blade: Ablan  Blade size: #3  ETT size (mm): 8.0    Cormack-Lehane Classification: grade I - full view of glottis  Placement verified by: capnometry   Cuff volume (mL): 9  Measured from: lips  ETT to lips (cm): 22  Number of attempts at approach: 1

## 2025-01-13 NOTE — ANESTHESIA PROCEDURE NOTES
Central Line    Date/Time: 1/13/2025 7:02 AM    Performed by: Rufus Mars MD  Authorized by: Rufus Mars MD    General Information and Staff    Procedure Start:  1/13/2025 7:02 AM  Procedure End:  1/13/2025 7:12 AM  Anesthesiologist:  Rufus Mars MD  Performed by:  Anesthesiologist  Patient Location:  OR  Indication: central venous access and CVP monitoring    Site Identification: real time ultrasound guided    Preanesthetic Checklist: 2 patient identifiers, IV checked, risks and benefits discussed, monitors and equipment checked, pre-op evaluation, timeout performed, anesthesia consent and sterile technique used    Procedure Detail    Patient Position:  Trendelenburg  Laterality:  Right  Site:  Internal jugular  Prep:  Chloraprep  Catheter Size:  9 Fr  Catheter Type:  MAC introducer  Number of Lumens:  Double lumen  Procedure Detail: target vein identified, needle advanced into vein and blood aspirated and guidewire advanced into vein    Seldinger Technique?: Yes    Intravenous Verification: verified by ultrasound and venous blood return    Post Insertion: all ports aspirated, all ports flushed easily, guidewire was removed intact, line was sutured in place and dressing was applied      Assessment    Events: patient tolerated procedure well with no complications      PA Catheter Placement    PA Catheter Placed?: Yes    PA Catheter Type:  Oximetric  PA Catheter Size:  8  Laterality:  Right  Site:  Internal jugular  Placement Confirmation: pressure tracing changes and verified by TERRELL    PA Catheter Depth (cm):  50  Events: patient tolerated procedure well with no complications      Additional Comments

## 2025-01-13 NOTE — DISCHARGE INSTRUCTIONS
Your appointment with Kary Barros on 2/11 has an address change, we will be in the new building on the hospital campus:  [facility]  [address] [place] [address]  To access the Dr. Juares discharge instructions video on your home computer:   [email or web address]  Remove steri strips from all incisions on 1/27    [facility] @ discharge  Phone  [phone]  Fax  [phone]    Heart Healthy - Reduced Sodium Nutrition Therapy    A heart-healthy diet is recommended to reduce your unhealthy blood cholesterol levels, manage high blood pressure, and lower your risk for heart disease.  To follow a heart-healthy diet,   Eat a balanced diet with whole grains, fruits and vegetables, and lean protein sources.  Achieve and maintain a healthy weight.  Choose heart-healthy unsaturated fats. Limit saturated fats, trans fats, and cholesterol intake. Eat more plant-based or vegetarian meals using beans and soy foods for protein.  Eat whole, unprocessed foods to limit the amount of sodium (salt) you eat.  Limit refined carbohydrates especially sugar, sweets and sugar-sweetened beverages.  If you drink alcohol, do so in moderation: one serving per day (women) and two servings per day (men).  One serving is equivalent to 12 ounces beer, 5 ounces wine, or 1.5 ounces distilled spirits    Tips  Tips for Choosing Heart-Healthy Fats  Choose lean protein and low-fat dairy foods to reduce saturated fat intake.  Saturated fat is usually found in animal-based protein and is associated with certain health risks. Saturated fat is the biggest contributor to raised low-density lipoprotein (LDL) cholesterol levels in the diet. Research shows that limiting saturated fat lowers unhealthy cholesterol levels.  Eat no more than 7% of your total calories each day from saturated fat.  Ask your RDN to help you determine how much saturated fat is right for you.  There are many foods that do not  contain large amounts of saturated fats. Swapping these foods to replace foods high in saturated fats will help you limit the saturated fat you eat and improve your cholesterol levels. You can also try eating more plant-based or vegetarian meals.      Instead of…  Try...   Whole milk, cheese, yogurt, and ice  cream 1%, ½%, or skim milk, low-fat cheese, non-fat  yogurt, and low-fat ice cream   Fatty, marbled beef and pork  Lean beef, pork, or venison   Poultry with skin  Poultry without skin   Butter, stick margarine  Reduced-fat, whipped, or liquid spreads   Coconut oil, palm oil Liquid vegetable oils: corn, canola, olive, soybean  and safflower oils         Avoid trans fats.  Trans fats increase levels of LDL-cholesterol. Hydrogenated fat in processed foods is the main source of trans fats in foods.  Trans fats can be found in stick margarine, shortening, processed sweets, baked goods, some fried foods, and packaged foods made with hydrogenated oils. Avoid foods with “partially hydrogenated oil” on the ingredient list such as: cookies, pastries, baked goods, biscuits, crackers, microwave popcorn, and frozen dinners.    Choose foods with heart healthy fats.  Polyunsaturated and monounsaturated fat are unsaturated fats that may help lower your blood cholesterol level when used in place of saturated fat in your diet.  Ask your RDN about taking a dietary supplement with plant sterols and stanols to help lower your cholesterol level.  Research shows that substituting saturated fats with unsaturated fats is beneficial to cholesterol levels. Try these easy swaps:    Instead of…  Try:   Butter, stick margarine, or solid  shortening Reduced-fat, whipped, or liquid spreads   Beef, pork, or poultry with skin Fish and seafood   Chips, crackers, snack foods Raw or unsalted nuts and seeds or nut  [place]  Hummus with vegetables  Avocado on toast   Coconut oil, palm oil Liquid vegetable oils: corn, canola, olive,  soybean and  safflower oils       Limit the amount of cholesterol you eat to less than 200 milligrams per day.  Cholesterol is a substance carried through the bloodstream via lipoproteins, which are known as “transporters” of fat.  Some body functions need cholesterol to work properly, but too much cholesterol in the bloodstream can damage arteries and build up blood vessel linings (which can lead to heart attack and stroke). You should eat less than 200 milligrams cholesterol per day.  People respond differently to eating cholesterol. There is no test available right now that can figure out which people will respond more to dietary cholesterol and which will respond less. For individuals with high intake of dietary cholesterol, different types of increase (none, small, moderate, large) in LDL-cholesterol levels are all possible.  Food sources of cholesterol include egg yolks and organ meats such as liver, gizzards. Limit egg yolks to two to four per week and avoid organ meats like liver and gizzards to control cholesterol intake.    Tips for Choosing Heart-Healthy Carbohydrates  Consume foods rich in viscous (soluble) fiber  Viscous, or soluble, fiber is found in the walls of plant cells. Viscous fiber is found only in plant-based foods--animalbased foods like meat or dairy products do not contain fiber. In the stomach, viscous fibers absorb water and swell to form a thick, jelly-like mass. This helps to lower your unhealthy cholesterol  Rich sources of viscous fiber include asparagus, [place] sprouts, sweet potatoes, turnips, apricots, mangoes, oranges, legumes, barley, oats, and oat bran.  Eat at least 5 to 10 grams of viscous fiber each day. As you increase your fiber intake gradually, also increase the amount of water you drink. This will help prevent constipation.  If you have difficulty achieving this goal, ask your RDN about fiber laxatives. Choose fiber supplements made with viscous fibers such as psyllium seed husks  or methylcellulose to help lower unhealthy cholesterol.    Limit refined carbohydrates  There are three types of carbohydrates: starches, sugar, and fiber. Some carbohydrates occur naturally in food, like the starches in rice or corn or the sugars in fruits and milk. Refined carbohydrates--foods with high amounts of simple sugars--can raise triglyceride levels. High triglyceride levels are associated with coronary heart disease.  Some examples of refined carbohydrate foods are table sugar, sweets, and beverages sweetened with added sugar.    Tips for Reducing Sodium (Salt)  You should aim to limit your sodium intake to less than 2,000 mg sodium per day  Although sodium is important for your body to function, too much sodium can be harmful for people with high blood pressure.  As sodium and fluid buildup in your tissues and bloodstream, your blood pressure increases. High blood pressure may cause damage to other organs and increase your risk for a stroke.  Even if you take a pill for blood pressure or a water pill (diuretic) to remove fluid, it is still important to have less salt in your diet. Ask your doctor and RDN what amount of sodium is right for you.  Avoid processed foods. Eat more fresh foods.  Fresh fruits and vegetables are naturally low in sodium, as well as frozen vegetables and fruits that have no added juices or sauces.  Fresh meats are lower in sodium than processed meats, such as thomason, sausage, and hotdogs. Read the nutrition label or ask your  to help you find a fresh meat that is low in sodium.  Eat less salt--at the table and when cooking.  A single teaspoon of table salt has 2,300 mg of sodium.  Leave the salt out of recipes for pasta, casseroles, and soups.    Ask your RDN how to cook your favorite recipes without sodium  Be a smart .  Look for food packages that say “salt-free” or “sodium-free.” These items contain less than 5 milligrams of sodium per serving.  “Very low-sodium”  products contain less than 35 milligrams of sodium per serving.  “Low-sodium” products contain less than 140 milligrams of sodium per serving.  Beware of “reduced salt” or “reduced sodium” products. These items may still be high in sodium. Check the nutrition label.  Add flavors to your food without adding sodium.  Try lemon juice, lime juice, fruit juice or vinegar.  Dry or fresh herbs add flavor. Try basil, bay leaf, dill, rosemary, parsley, alisha, dry mustard, nutmeg, thyme, and paprika.  Pepper, red pepper flakes, and cayenne pepper can add spice to your meals without adding sodium. Hot sauce contains sodium, but if you use just a drop or two, it will not add up to much.  Buy a sodium-free seasoning blend or make your own at home.    Additional Lifestyle Tips  Achieve and maintain a healthy weight.  Talk with your RDN or your doctor about what is a healthy weight for you.  Set goals to reach and maintain that weight.  To lose weight, reduce your calorie intake along with increasing your physical activity. A weight loss of 10 to 15 pounds could reduce LDL-cholesterol by 5 milligrams per deciliter.  Participate in physical activity.  Talk with your health care team to find out what types of physical activity are best for you. Set a plan to get about 30 minutes of exercise on most days.    Foods Recommended  Grains  Grain foods including whole grains: whole wheat, barley, rye, buckwheat, corn, teff, quinoa, millet, amaranth, brown or wild rice, sorghum, and oats  Processed whole grains such as pasta, rice, hot and cold cereals, and snacks that contain less than 300 mg sodium per serving  Whole grain bread, crackers, rolls, or pili with <80 mg sodium per slice (Note: yeast breads usually have less sodium than those made with baking soda),  Home-made bread made with reduced-sodium baking soda    Protein  Fresh red meat: lean, trimmed cuts of beef, pork, or lamb  Fresh poultry: skinless chicken or turkey  Fresh  seafood: fish (particularly fatty fish: salmon, herring), shrimp, lobster, clams, and scallops  Eggs (2-4 per week), eggwhites or egg substitute  Nuts and seeds (unsalted): peanuts, almonds, pistachios, and sunflower seeds, unsalted; peanut butter, almond butter, and sunflower seed butter, reduced sodium.  Soy foods: tofu, tempeh, or soynuts  Meat alternatives: veggie burgers and sausages from plant protein without added sodium  Legumes: such as dried beans, lentils, or peas at least a few times per week in place of other protein sources, unsalted    Dairy  Skim, ½% or 1% milk, low-fat yogurt, low-sodium cheeses (Swiss cheese, ricotta cheese, and fresh mozzarella, low sodium cottage cheese)  Fortified soymilk, almond milk, rice milk, hemp milk  Frozen desserts (½ cup) made from low-fat milk    Vegetables  Fresh, frozen, and canned (unsalted) whole vegetables, including dark-green, red and orange vegetables, legumes (beans and peas), and starchy vegetables without added sauces, salt, or sodium; low-sodium vegetable juices    Fruits  Fresh, frozen, canned and dried whole unsweetened fruits canned fruit packed in water or fruit juice without added sugar; 100% fruit juice    Oils  Unsaturated vegetable oils: [place], avocado, canola, cashew, corn, grapeseed, olive, safflower, sesame, soybean, sunflower  Margarines and spreads which list liquid vegetable oil as the first ingredient and does not contain trans fats (partially hydrogenated oil)  Salad dressings made from oil and low in sodium (salt)  Avocado    Other  Prepared foods, including soups, casseroles, and salads made from recommended ingredients and contain <600 mg sodium.  Homemade soups, casseroles, entrees, and side dishes typically contain less sodium that prepared alternatives.  Homemade soups and sauces such as gravy  Low-sodium crackers, chips, pretzels  Low-sodium seasonings (ketchup, BBQ)  Spices, herbs, Salt-free seasoning mixes and  marinades  Vinegar  Lemon or lime juice      Foods Not Recommended  Grains  Breakfast cereals, packaged baked goods, snack crackers, and prepared grains with more than 300 mg sodium per serving  Biscuits, cornbread, and other “quick” breads prepared with baking soda  Breads or crackers topped with salt  Bakery products, such as doughnuts, biscuits, croissants, [place] Islander pastries, pies, cookies  Instant potatoes, noodles, rice, stuffing mix, or macaroni and cheese  Snacks made with partially hydrogenated oils, including chips, cheese puffs, snack mixes, regular crackers, butter-flavored popcorn  Prepackaged bread crumbs  Self-rising flours    Protein  Meats high in saturated fat such as ribs, t-bone steak, regular 70/30 hamburger  Processed red meats, such as thomason, sausage, ham, pepperoni, hot dogs, corned beef, cured or smoke meats, canned meat, chili, steph sausage, sardines, and spam with added sodium  Deli meats, such as pastrami, bologna, or salami (made of meat or poultry) with added sodium  Organ meat such as liver, gizzards, or sweetbread  Preseasoned and precooked meats  Poultry with skin or processed poultry (chicken and turkey) with skin, breading, or high-sodium marinades or sauces  Whole eggs or egg yolks (greater than 5 per week)  Fried meat, poultry, or fish  Smoked fish and meats  Salted legumes, nuts, seeds, or nut/seed butters  Meat alternatives with high levels of sodium (>300 mg per serving) or saturated fat (>5 g per serving)    Dairy  Whole milk,?2% fat milk, or Buttermilk  Cream, half-&-half  Cream cheese, sour cream  Regular and processed cheese or sauces  Regular-sodium cottage cheese    Vegetables  Canned or frozen vegetables with salt, fresh vegetables prepared with salt, butter, cheese, or cream sauce  Pickled vegetables such as olives, pickles, or sauerkraut  Tomato or pasta sauce with high levels of salt (>300 mg per serving)  Fried vegetables    Fruits   None    Oils  Solid shortening or  partially hydrogenated oils  Solid margarine made with hydrogenated or partially hydrogenated oils or trans fat  Salted margarine that contains trans fats  Butter (salted or unsalted)  Salad dressings with trans fat or high levels of sodium ([place], blue cheese, Ethiopian, Italian)  Tropical oils (coconut, palm, palm kernel oils)    Other  Sugary and/or fatty desserts, candy, and other sweets  Canned soups that are >600 mg of sodium  Frozen meals and prepared sides that are >600 mg of sodium  Store-bought egg beaters (with added sodium)  Salts: sea salt, kosher salt, onion salt, and garlic salt, seasoning mixes containing salt  Flavorings: bouillon cubes, catsup or ketchup, barbeque sauce, Worcestershire sauce, soy sauce, salsa, relish, teriyaki sauce      Heart-Healthy Eating Sample 1-Day Menu  Breakfast  1 cup oatmeal  1 cup fat-free milk  1 cup blueberries  1 cup brewed coffee  1 ounce almonds  Lunch  2 slices whole-wheat bread  2 oz lean deli turkey breast  1 oz low-fat Swiss cheese  2 slices tomato  2 lettuce leaves  1 pear  1 cup skim milk  Afternoon Snack   1 oz trail mix (with nuts, seeds, raisins)  Evening Meal  3 oz broiled salmon  2/3 cup brown rice  1 tsp margarine  1/2 cup cooked broccoli  1/2 cup cooked carrots  1 cup tossed salad  1 teaspoon olive oil and vinegar dressing  1 small whole-wheat roll  1 tsp margarine  1 cup tea  Evening Snack   1 banana

## 2025-01-13 NOTE — ANESTHESIA PROCEDURE NOTES
Procedure Performed: TERRELL       Start Time:  1/13/2025 7:15 AM       End Time:   1/13/2025 12:30 PM    Preanesthesia Checklist:  Patient identified, IV assessed, risks and benefits discussed, monitors and equipment assessed, procedure being performed at surgeon's request and anesthesia consent obtained.    General Procedure Information  Diagnostic Indications for Echo:  assessment of ascending aorta and hemodynamic monitoring  Physician Requesting Echo: Ronal Cooper MD  Location performed:  OR  Intubated  Bite block placed  Heart visualized  Probe Insertion:  Easy  Probe Type:  Multiplane  Modalities:  2D only, color flow mapping, pulse wave Doppler and continuous wave Doppler    Echocardiographic and Doppler Measurements    Ventricles    Right Ventricle:  Cavity size normal.  Hypertrophy not present.  Thrombus not present.  Global function normal.    Left Ventricle:  Cavity size normal.  Hypertrophy present.  Thrombus not present.  Global Function normal.  Ejection Fraction 50%.      Ventricular Regional Function:  1- Basal Anteroseptal:  normal  2- Basal Anterior:  normal  3- Basal Anterolateral:  hypokinetic  4- Basal Inferolateral:  hypokinetic  5- Basal Inferior:  normal  6- Basal Inferoseptal:  normal  7- Mid Anteroseptal:  normal  8- Mid Anterior:  normal  9- Mid Anterolateral:  hypokinetic  10- Mid Inferolateral:  hypokinetic  11- Mid Inferior:  normal  12- Mid Inferoseptal:  normal  13- Apical Anterior:  normal  14- Apical Lateral:  hypokinetic  15- Apical Inferior:  normal  16- Apical Septal:  normal  17- Bronaugh:  hypokinetic      Valves    Aortic Valve:  Annulus normal.  Stenosis not present.  Regurgitation absent.  Leaflets normal.  Leaflet motions normal.      Mitral Valve:  Annulus normal.  Stenosis not present.  Regurgitation +1.  Leaflets normal.  Leaflet motions normal.      Tricuspid Valve:  Annulus normal.  Stenosis not present.  Regurgitation +1.  Leaflets normal.    Pulmonic Valve:  Annulus  normal.        Aorta    Ascending Aorta:  Size normal.  Dissection not present.  Plaque thickness less than 3 mm.  Mobile plaque not present.    Aortic Arch:  Size normal.  Dissection not present.  Plaque thickness less than 3 mm.  Mobile plaque not present.    Descending Aorta:  Size normal.  Dissection not present.  Plaque thickness less than 3 mm.  Mobile plaque not present.        Atria    Right Atrium:  Size normal.  Spontaneous echo contrast not present.  Thrombus not present.  Tumor not present.  Device present.    Left Atrium:  Size normal.  Spontaneous echo contrast not present.  Thrombus not present.  Tumor not present.  Device not present.    Left atrial appendage normal.      Septa    Atrial Septum:  Intra-atrial septal morphology normal.      Ventricular Septum:  Intra-ventricular septum morphology normal.          Other Findings  Pericardium:  normal  Pleural Effusion:  none  Pulmonary Arteries:  normal  Pulmonary Venous Flow:  normal    Anesthesia Information  Performed Personally  Anesthesiologist:  Rufus Mars MD      Post    Ventricular FXN:  Global FXN: Improved   Regional FXN: Improved  Valve FXN:  Native Valve:No change            Comments: No aortic dissection    Complications:None

## 2025-01-14 ENCOUNTER — APPOINTMENT (OUTPATIENT)
Dept: GENERAL RADIOLOGY | Facility: HOSPITAL | Age: 45
End: 2025-01-14
Attending: PHYSICIAN ASSISTANT
Payer: COMMERCIAL

## 2025-01-14 ENCOUNTER — APPOINTMENT (OUTPATIENT)
Dept: GENERAL RADIOLOGY | Facility: HOSPITAL | Age: 45
End: 2025-01-14
Attending: SURGERY
Payer: COMMERCIAL

## 2025-01-14 LAB
ATRIAL RATE: 63 BPM
BASE EXCESS BLD CALC-SCNC: -6 MMOL/L
BASE EXCESS BLDA CALC-SCNC: 1 MMOL/L (ref ?–30)
BASOPHILS # BLD AUTO: 0.02 X10(3) UL (ref 0–0.2)
BASOPHILS NFR BLD AUTO: 0.1 %
BLOOD TYPE BARCODE: 6200
BUN BLD-MCNC: 14 MG/DL (ref 9–23)
CA-I BLD-SCNC: 1.06 MMOL/L (ref 1.12–1.32)
CA-I BLDA-SCNC: 1.07 MMOL/L (ref 1.12–1.32)
CALCIUM BLD-MCNC: 8.8 MG/DL (ref 8.7–10.4)
CHLORIDE SERPL-SCNC: 111 MMOL/L (ref 98–112)
CO2 BLD-SCNC: 20 MMOL/L (ref 22–32)
CO2 BLDA-SCNC: 26 MMOL/L (ref 22–32)
CO2 SERPL-SCNC: 26 MMOL/L (ref 21–32)
CREAT BLD-MCNC: 0.94 MG/DL
EGFRCR SERPLBLD CKD-EPI 2021: 101 ML/MIN/1.73M2 (ref 60–?)
EOSINOPHIL # BLD AUTO: 0 X10(3) UL (ref 0–0.7)
EOSINOPHIL NFR BLD AUTO: 0 %
ERYTHROCYTE [DISTWIDTH] IN BLOOD BY AUTOMATED COUNT: 13.6 %
GLUCOSE BLD-MCNC: 107 MG/DL (ref 70–99)
GLUCOSE BLD-MCNC: 111 MG/DL (ref 70–99)
GLUCOSE BLD-MCNC: 116 MG/DL (ref 70–99)
GLUCOSE BLD-MCNC: 119 MG/DL (ref 70–99)
GLUCOSE BLD-MCNC: 127 MG/DL (ref 70–99)
GLUCOSE BLD-MCNC: 128 MG/DL (ref 70–99)
GLUCOSE BLD-MCNC: 128 MG/DL (ref 70–99)
GLUCOSE BLD-MCNC: 129 MG/DL (ref 70–99)
GLUCOSE BLD-MCNC: 132 MG/DL (ref 70–99)
GLUCOSE BLD-MCNC: 149 MG/DL (ref 70–99)
GLUCOSE BLD-MCNC: 173 MG/DL (ref 70–99)
GLUCOSE BLD-MCNC: 198 MG/DL (ref 70–99)
GLUCOSE BLD-MCNC: 86 MG/DL (ref 70–99)
GLUCOSE BLDA-MCNC: 181 MG/DL (ref 70–99)
HCO3 BLD-SCNC: 19.4 MEQ/L
HCO3 BLDA-SCNC: 25.3 MEQ/L (ref 22–26)
HCT VFR BLD AUTO: 33.2 %
HCT VFR BLD CALC: 32 %
HCT VFR BLDA CALC: 32 %
HGB BLD-MCNC: 11.1 G/DL
IMM GRANULOCYTES # BLD AUTO: 0.08 X10(3) UL (ref 0–1)
IMM GRANULOCYTES NFR BLD: 0.6 %
ISTAT ACTIVATED CLOTTING TIME: 124 SECONDS (ref 74–137)
ISTAT ACTIVATED CLOTTING TIME: 406 SECONDS (ref 74–137)
ISTAT ACTIVATED CLOTTING TIME: 487 SECONDS (ref 74–137)
ISTAT ACTIVATED CLOTTING TIME: 492 SECONDS (ref 74–137)
ISTAT PATIENT TEMPERATURE: 37 DEGREE
LYMPHOCYTES # BLD AUTO: 0.7 X10(3) UL (ref 1–4)
LYMPHOCYTES NFR BLD AUTO: 5.1 %
MAGNESIUM SERPL-MCNC: 1.9 MG/DL (ref 1.6–2.6)
MCH RBC QN AUTO: 28.3 PG (ref 26–34)
MCHC RBC AUTO-ENTMCNC: 33.4 G/DL (ref 31–37)
MCV RBC AUTO: 84.7 FL
MONOCYTES # BLD AUTO: 1.81 X10(3) UL (ref 0.1–1)
MONOCYTES NFR BLD AUTO: 13.2 %
NEUTROPHILS # BLD AUTO: 11.1 X10 (3) UL (ref 1.5–7.7)
NEUTROPHILS # BLD AUTO: 11.1 X10(3) UL (ref 1.5–7.7)
NEUTROPHILS NFR BLD AUTO: 81 %
P AXIS: -2 DEGREES
P-R INTERVAL: 176 MS
PCO2 BLD: 33.2 MMHG
PCO2 BLDA: 36.1 MMHG (ref 35–45)
PH BLD: 7.37 [PH]
PH BLDA: 7.46 [PH] (ref 7.35–7.45)
PLATELET # BLD AUTO: 159 10(3)UL (ref 150–450)
PO2 BLD: 355 MMHG
PO2 BLDA: 400 MMHG (ref 80–105)
POTASSIUM BLD-SCNC: 3.2 MMOL/L (ref 3.6–5.1)
POTASSIUM SERPL-SCNC: 4.2 MMOL/L (ref 3.5–5.1)
Q-T INTERVAL: 482 MS
QRS DURATION: 162 MS
QTC CALCULATION (BEZET): 493 MS
R AXIS: 102 DEGREES
RBC # BLD AUTO: 3.92 X10(6)UL
SAO2 % BLD: 100 %
SAO2 % BLDA: 100 % (ref 92–100)
SODIUM BLD-SCNC: 144 MMOL/L (ref 136–145)
SODIUM BLDA-SCNC: 139 MMOL/L (ref 136–145)
SODIUM BLDA-SCNC: 6.1 MMOL/L (ref 3.6–5.1)
SODIUM SERPL-SCNC: 140 MMOL/L (ref 136–145)
T AXIS: 9 DEGREES
UNIT VOLUME: 187 ML
UNIT VOLUME: 234 ML
UNIT VOLUME: 350 ML
VENTRICULAR RATE: 63 BPM
WBC # BLD AUTO: 13.7 X10(3) UL (ref 4–11)

## 2025-01-14 PROCEDURE — 71045 X-RAY EXAM CHEST 1 VIEW: CPT | Performed by: SURGERY

## 2025-01-14 PROCEDURE — 71045 X-RAY EXAM CHEST 1 VIEW: CPT | Performed by: PHYSICIAN ASSISTANT

## 2025-01-14 PROCEDURE — 99232 SBSQ HOSP IP/OBS MODERATE 35: CPT | Performed by: HOSPITALIST

## 2025-01-14 RX ORDER — AMIODARONE HYDROCHLORIDE 200 MG/1
400 TABLET ORAL 3 TIMES DAILY
Status: COMPLETED | OUTPATIENT
Start: 2025-01-14 | End: 2025-01-15

## 2025-01-14 RX ORDER — METOCLOPRAMIDE 10 MG/1
10 TABLET ORAL
Status: DISCONTINUED | OUTPATIENT
Start: 2025-01-14 | End: 2025-01-15

## 2025-01-14 RX ORDER — KETOROLAC TROMETHAMINE 30 MG/ML
30 INJECTION, SOLUTION INTRAMUSCULAR; INTRAVENOUS EVERY 6 HOURS
Status: DISCONTINUED | OUTPATIENT
Start: 2025-01-14 | End: 2025-01-14

## 2025-01-14 RX ORDER — AMIODARONE HYDROCHLORIDE 200 MG/1
400 TABLET ORAL DAILY
Status: COMPLETED | OUTPATIENT
Start: 2025-01-16 | End: 2025-01-17

## 2025-01-14 RX ORDER — METOCLOPRAMIDE HYDROCHLORIDE 5 MG/ML
10 INJECTION INTRAMUSCULAR; INTRAVENOUS
Status: DISCONTINUED | OUTPATIENT
Start: 2025-01-14 | End: 2025-01-15

## 2025-01-14 RX ORDER — HYDROCODONE BITARTRATE AND ACETAMINOPHEN 5; 325 MG/1; MG/1
2 TABLET ORAL EVERY 4 HOURS PRN
Status: DISCONTINUED | OUTPATIENT
Start: 2025-01-14 | End: 2025-01-17

## 2025-01-14 RX ORDER — HYDROCODONE BITARTRATE AND ACETAMINOPHEN 5; 325 MG/1; MG/1
1 TABLET ORAL EVERY 4 HOURS PRN
Status: DISCONTINUED | OUTPATIENT
Start: 2025-01-14 | End: 2025-01-17

## 2025-01-14 RX ORDER — LOSARTAN POTASSIUM 25 MG/1
25 TABLET ORAL DAILY
Status: DISCONTINUED | OUTPATIENT
Start: 2025-01-14 | End: 2025-01-17

## 2025-01-14 RX ORDER — METOCLOPRAMIDE HYDROCHLORIDE 5 MG/ML
INJECTION INTRAMUSCULAR; INTRAVENOUS
Status: COMPLETED
Start: 2025-01-14 | End: 2025-01-14

## 2025-01-14 RX ORDER — FUROSEMIDE 10 MG/ML
40 INJECTION INTRAMUSCULAR; INTRAVENOUS ONCE
Status: COMPLETED | OUTPATIENT
Start: 2025-01-14 | End: 2025-01-14

## 2025-01-14 RX ORDER — KETOROLAC TROMETHAMINE 30 MG/ML
30 INJECTION, SOLUTION INTRAMUSCULAR; INTRAVENOUS EVERY 6 HOURS
Status: COMPLETED | OUTPATIENT
Start: 2025-01-14 | End: 2025-01-14

## 2025-01-14 NOTE — PLAN OF CARE
1420 Received post CABG X3 left atrial appendage removal. Intubated sedated . On Epi amiodarone insulin and proprofol and precedex gtts . Labs sent Potasium replaced . ABG'S to Dr. Mars  2 amps of sodium bicarb given. Calcium replaced . Consults notified of returned to CNICU . Chest xray done . Chest tube management . Pain management . Plans to wean and extubated once parameters met . Repeat ABG to Dr. Mars. 1 amp of sodium bicarb given 1700 ok to wean of sedation . 1815 Dr. Cooper here at bedside . Patient woke up following commands still sleepy . Shakes head no to pain . Family updated . Emotional support teaching post open heart continue CNICU monitoring .

## 2025-01-14 NOTE — PROGRESS NOTES
[facility]   part of [facility]     CV Surgery Progress Note    Wander Castro Patient Status:  Inpatient    1980 MRN [id number]   Location [facility] 6NE-A Attending Sancho Fairchild*   Hosp Day # 5 PCP No primary care provider on file.     Subjective:  Patient seen and examined by Dr. Cooper. Patient doing well. Pain is controlled. No nausea.     Tele: SR    Objective:  /73   Pulse 60   Temp 98.7 °F (37.1 °C) (Pulmonary Artery)   Resp 20   Ht 5' 10\" (1.778 m)   Wt 197 lb 1.5 oz (89.4 kg)   SpO2 99%   BMI 28.28 kg/m²     Intake/Output:    Intake/Output Summary (Last 24 hours) at 2025 0919  Last data filed at 2025 0800  Gross per 24 hour   Intake 7546 ml   Output 1865 ml   Net 5681 ml       Labs:  Lab Results   Component Value Date    WBC 13.7 2025    RBC 3.92 2025    HGB 11.1 2025    HCT 33.2 2025    MCV 84.7 2025    MCH 28.3 2025    MCHC 33.4 2025    RDW 13.6 2025    .0 2025     Lab Results   Component Value Date     2025    K 4.2 2025     2025    CO2 26.0 2025    BUN 14 2025    CREATSERUM 0.94 2025     2025    CA 8.8 2025     Lab Results   Component Value Date    INR 1.56 (H) 2025    INR 1.65 (H) 2025    INR 1.11 2025       Studies:  CXR:   Low lung volumes. Mild vasc congestion.    Physical Exam:  General: VSS, In NAD  Neck: No JVD  Heart: RRR ; Sternum Stable     Assessment/Plan:   S/P CABGx3, amputation of SAMARA POD#1  -Hypertension, po meds resumed   -PO amio for Afib ppx   -Leukocytosis, low grade temp, likely reactive- monitor   -Acute post op blood loss anemia, expected- monitor   -Mild vol OL, lasix x1 today   -Renal function intact, good UOP- monitor   -Bowel regimen   -Pain management, dilaudid pca  -Chest tubes removed  -Keep PW for now   -GI PPX: protonix  -DVT PPX: TEDs/SCDs  -Encourage IS/ambulation    -PT/OT/Cardiac rehab   -CCU monitoring     -D/W MONIE Garcia-C  Cardiothoracic Surgery   1/14/2025  9:19 AM

## 2025-01-14 NOTE — PAYOR COMM NOTE
--------------  1/13 CONTINUED STAY REVIEW    Payor: [facility] [place] (NON CONTRACTED IPA)  Subscriber #:  [id number]  Authorization Number: [id number]        TO OR FOR:    CORONARY ARTERY BYPASS GRAFT x3, INTRAOPERATIVE TRANSESOPHAGEAL ECHOCARDIOGRAM, ENDOSCOPIC HARVEST OF LEFT SAPHENOUS VEIN, AMPUTATION LEFT ATRIAL APPENDAGE     CARDS:     Assessment and Plan:     CV - S/P CABG, POD#0, normal LV fxn preop; doing well; hemodynamics at goal; EKG without injury; supportive care; wean pressors and extubate  Lipids - statin  Left subclavian stenosis - distal to LIMA  L3     Subjective:  Intubated and sedated     PAP: (22-46)/(12-20) 46/20  CO:  [6.1 L/min-7.8 L/min] 7.8 L/min  CI:  [3 L/min/m2-3.8 L/min/m2] 3.8 L/min/m2     Physical Exam:  Blood pressure 104/56, pulse 65, temperature (!) 96.4 °F (35.8 °C), temperature source Pulmonary Artery, resp. rate 22, height 5' 10\" (1.778 m), weight 186 lb 14.4 oz (84.8 kg), SpO2 98%.    General: In no apparent distress.  Cardiac: Regular rate and rhythm, +rub  Lungs: Clear   Abdomen: Soft, non-tender.   Extremities: Without clubbing, cyanosis or edema.  Neurologic: sedated  Skin: Warm and dry.         Medication Infusions    insulin regular      propofol 25 mcg/kg/min (01/13/25 1507)    dexmedetomidine 0.5 mcg/kg/hr (01/13/25 1502)    sodium chloride      DOBUTamine      nitroGLYCERIN in dextrose 5%      norepinephrine      NitroPRUSSide (Nipride) 50 mg in dextrose 5% 250 mL infusion      dextrose 5%-sodium chloride 0.45% 80 mL/hr (01/13/25 8875)    sodium chloride      HYDROmorphone in sodium chloride 0.9%      amiodarone 1 mg/min (01/13/25 2209)         Scheduled:                  Scheduled Medications    chlorhexidine gluconate  15 mL Mouth/Throat BID@0800,2000    [Held by provider] aspirin  81 mg Oral Daily    acetaminophen  1,000 mg Intravenous Q6H    [START ON 1/14/2025] sennosides  8.6 mg Oral BID    [START ON 1/14/2025] docusate sodium  100 mg Oral BID    vancomycin  15  mg/kg Intravenous Q12H    [START ON 1/14/2025] metoprolol tartrate  12.5 mg Oral 2x Daily(Beta Blocker)    mupirocin  1 Application Nasal BID    [START ON 1/14/2025] pantoprazole  40 mg Intravenous QAM AC     Or    [START ON 1/14/2025] pantoprazole  40 mg Oral QAM AC    ceFAZolin  2 g Intravenous Q8H    rosuvastatin  20 mg Oral Nightly

## 2025-01-14 NOTE — PROGRESS NOTES
[facility]   [facility] Progress Note     Wander Castro Patient Status:  Inpatient    1980 MRN [id number]   Location [facility] 6NE-A Attending Leslie Hernandes DO   Hosp Day # 5 PCP No primary care provider on file.     Chief Complaint: Chest pain    Subjective:     Patient seen examined at bedside.  No overnight events or new complaints.     Objective:    Review of Systems:   A comprehensive review of systems was completed; pertinent positive and negatives stated in subjective.    Vital signs:  Temp:  [96.4 °F (35.8 °C)-100.3 °F (37.9 °C)] 97.9 °F (36.6 °C)  Pulse:  [53-69] 66  Resp:  [8-22] 13  BP: ()/(56-95) 155/85  SpO2:  [92 %-99 %] 93 %  FiO2 (%):  [40 %-50 %] 40 %    Physical Exam:    General: No acute distress  Respiratory: No wheezes, no rhonchi  Cardiovascular: S1, S2, regular rate and rhythm  Abdomen: Soft, Non-tender, non-distended, positive bowel sounds  Neuro: No new focal deficits.   Extremities: No edema      Diagnostic Data:    Labs:  Recent Labs   Lab 25  0509 25  0818 01/10/25  0311 25  0559 25  0414 25  1204 25  1444 25  0409   WBC 8.0 9.6  --   --  8.1  --  17.6* 13.7*   HGB 15.4 14.8  --   --  13.2  --  11.2* 11.1*   MCV 84.4 82.6  --   --  84.0  --  86.3 84.7   .0 200.0   < > 163.0 192.0  192.0 153.0 148.0* 159.0   INR 1.11  --   --   --   --  1.65* 1.56*  --     < > = values in this interval not displayed.       Recent Labs   Lab 25  0509 25  0818 25  0414 25  1444 25  0409   *   < > 106* 188* 128*   BUN 14   < > 12 13 14   CREATSERUM 1.00   < > 0.86 1.06 0.94   CA 10.5*   < > 9.4 8.0* 8.8   ALB 4.4  --   --   --   --       < > 142 148* 140   K 3.8   < > 3.9 3.2* 4.2      < > 110 112 111   CO2 28.0   < > 25.0 20.0* 26.0   ALKPHO 55  --   --   --   --    AST 18  --   --   --   --    ALT 23  --   --   --   --    BILT 0.8  --   --   --   --    TP  7.6  --   --   --   --     < > = values in this interval not displayed.       Estimated Glomerular Filtration Rate: 101.2 mL/min/1.73m2 (by CKD-EPI based on SCr of 0.94 mg/dL).    Recent Labs   Lab 01/11/25  0559 01/12/25  0414 01/13/25  0447   TROPHS 2,125* 1,343* 3,719*       Recent Labs   Lab 01/09/25  0509 01/13/25  1204 01/13/25  1444   PTP 14.4 19.7* 18.8*   INR 1.11 1.65* 1.56*          Microbiology    No results found for this visit on 01/09/25.      Imaging: Reviewed in Epic.    Medications:    amiodarone  400 mg Oral TID    Followed by    [START ON 1/16/2025] amiodarone  400 mg Oral Daily    ketorolac  30 mg Intravenous Q6H    insulin aspart  1-10 Units Subcutaneous TID AC and HS    [Held by provider] aspirin  81 mg Oral Daily    sennosides  8.6 mg Oral BID    docusate sodium  100 mg Oral BID    metoprolol tartrate  12.5 mg Oral 2x Daily(Beta Blocker)    mupirocin  1 Application Nasal BID    pantoprazole  40 mg Intravenous QAM AC    Or    pantoprazole  40 mg Oral QAM AC    ceFAZolin  2 g Intravenous Q8H    rosuvastatin  20 mg Oral Nightly       Assessment & Plan:      #STEMI S/P 3 vessel CABG  - Off insulin drip  - SSI started this am.  -Multivessel disease on cath  -Plan is for three-vessel bypass today  -Cardiology and cardiothoracic surgery following    # Hypertension  -Blood pressure meds on hold with patient being on nitro drip.    # Hyperlipidemia  -Continue statin therapy.      Sancho Fairchild, DO    Supplementary Documentation:     Quality:  DVT Mechanical Prophylaxis: NICOLAS hose, KWAKUs,    DVT Pharmacologic Prophylaxis   Medication   None      DVT Pharmacologic prophylaxis: Aspirin 162 mg         Code Status: Not on file  Dunn: Dunn catheter in place  Dunn Duration (in days):   Central line:    RICHARD:     Discharge is dependent on: Clinical improvement  At this point Mr. Castro is expected to be discharge to: Home    The 21st Century Cures Act makes medical notes like these available to  patients in the interest of transparency. Please be advised this is a medical document. Medical documents are intended to carry relevant information, facts as evident, and the clinical opinion of the practitioner. The medical note is intended as peer to peer communication and may appear blunt or direct. It is written in medical language and may contain abbreviations or verbiage that are unfamiliar.

## 2025-01-14 NOTE — PHYSICAL THERAPY NOTE
PHYSICAL THERAPY EVALUATION - INPATIENT     Room Number: 6602/6602-A  Evaluation Date: 1/14/2025  Type of Evaluation: Initial  Physician Order: PT Eval and Treat    Presenting Problem: NSTEMI  Co-Morbidities : HTN/HLD  Reason for Therapy: Mobility Dysfunction and Discharge Planning    PHYSICAL THERAPY ASSESSMENT   Patient is a 44 year old male admitted 1/9/2025 for chest pain.  Prior to admission, patient's baseline is indep.  Patient is currently functioning below baseline with gait and stair negotiation.  Patient is requiring contact guard assist as a result of the following impairments: decreased functional strength, decreased endurance/aerobic capacity, pain, decreased muscular endurance, and medical status.  Physical Therapy will continue to follow for duration of hospitalization.    Patient will benefit from continued skilled PT Services for duration of hospitalization, however, given the patient is functioning near baseline level do not anticipate skilled therapy needs at discharge .    PLAN DURING HOSPITALIZATION  Nursing Mobility Recommendation : 1 Assist  PT Device Recommendation: Rolling walker        CURRENT GOALS    Goal #1 Patient is able to demonstrate supine - sit EOB @ level: supervision     Goal #2 Patient is able to demonstrate transfers EOB to/from Chair/Wheelchair at assistance level: supervision     Goal #3 Patient is able to ambulate 150 feet with assist device: walker - rolling at assistance level: supervision     Goal #4 Patient will navigate stairs with rail and SBA   Goal #5 Patient will participate in CV binder HEP    Goal #6    Goal Comments: Goals established on 1/16/2025            PHYSICAL THERAPY MEDICAL/SOCIAL HISTORY  History related to current admission: Patient is a 44 year old male admitted on 1/9/2025: Presented with chest pain while he was helping his dtr train for basketball in the garage dx NSTEMI, s/p LHC 1/9, s/p CABG x3 1/13    HOME SITUATION  Type of Home: House  Home  Patient called back and left a message stating he is no longer on medication. Pharmacy notified and medication list updated. Layout: Two level                     Lives With: Spouse (2 kids 9 and 12)    Drives: Yes   Patient Regularly Uses: None      Prior Level of [place]: indep, highly active, is a teacher and coaches boys volWeimobball, was hiking with his family at [employer] and playing basketball with his dtrs prior to admit     SUBJECTIVE  \"That was harder than I thought\"     OBJECTIVE  Precautions: Sternal;Cardiac  Fall Risk: Standard fall risk    WEIGHT BEARING RESTRICTION     PAIN ASSESSMENT  Rating: Unable to rate  Location: sternum  Management Techniques: Activity promotion;Body mechanics;Repositioning    COGNITION  Overall Cognitive Status:  WFL - within functional limits    RANGE OF MOTION AND STRENGTH ASSESSMENT  Upper extremity ROM and strength are within functional limits     Lower extremity ROM is within functional limits     Lower extremity strength is within functional limits     BALANCE  Static Sitting: Good  Dynamic Sitting: Good  Static Standing: Fair -  Dynamic Standing: Fair -    ADDITIONAL TESTS                                    ACTIVITY TOLERANCE  Pulse:  (64 at rest and 74 with activity)  Heart Rate Source: Monitor     BP: 151/83  BP Location: Right arm  BP Method: Automatic  Patient Position: Sitting    O2 WALK       NEUROLOGICAL FINDINGS                        AM-PAC '6-Clicks' INPATIENT SHORT FORM - BASIC MOBILITY  How much difficulty does the patient currently have...  Patient Difficulty: Turning over in bed (including adjusting bedclothes, sheets and blankets)?: None   Patient Difficulty: Sitting down on and standing up from a chair with arms (e.g., wheelchair, bedside commode, etc.): A Little   Patient Difficulty: Moving from lying on back to sitting on the side of the bed?: None   How much help from another person does the patient currently need...   Help from Another: Moving to and from a bed to a chair (including a wheelchair)?: A Little   Help from Another: Need to walk in hospital room?: A  Little   Help from Another: Climbing 3-5 steps with a railing?: A Little     AM-PAC Score:  Raw Score: 20   Approx Degree of Impairment: 35.83%   Standardized Score (AM-PAC Scale): 47.67   CMS Modifier (G-Code): CJ    FUNCTIONAL ABILITY STATUS  Gait Assessment   Functional Mobility/Gait Assessment  Gait Assistance: Contact guard assist  Distance (ft): 125  Assistive Device: Rolling walker  Pattern: Within Functional Limits    Skilled Therapy Provided     Bed Mobility:  Rolling: mod I   Supine to sit: mod I    Sit to supine: NT     Transfer Mobility:  Sit to stand: CGA   Stand to sit: CGA  Gait = CGA    Therapist's Comments: Discussed case with RN prior to session initiation. Pt agreeable to participation in therapy. Gait belt donned for out of bed mobility. Pt educated on role of therapy throughout stay, post op precautions with cuing for appropriate body mechanics during bed mobility (log roll technique) and UE placement during STS transfer to RW. Pt participated in gait training with RW. Educated on EC and pacing techniques and importance of continued ambulation for recovery per post op protocol. Pt tolerated session well HR 74 with activity.       Exercise/Education Provided:  Bed mobility  Body mechanics  Energy conservation  Functional activity tolerated  Gait training  Transfer training    Patient End of Session: Up in chair;Needs met;Call light within reach;RN aware of session/findings;All patient questions and concerns addressed;Hospital anti-slip socks      Patient Evaluation Complexity Level:  History Low - no personal factors and/or co-morbidities   Examination of body systems Low -  addressing 1-2 elements   Clinical Presentation Low- Stable   Clinical Decision Making Low Complexity       PT Session Time: 23 minutes  Gait Training: 10 minutes  Therapeutic Activity: 5 minutes  Neuromuscular Re-education:  minutes  Therapeutic Exercise:  minutes

## 2025-01-14 NOTE — PLAN OF CARE
Pt received vented, on cpap trial for vent weaning. ABG's and parameters called to Dr. Mars. Pt extubated at 2015. Pt is neurologically intact. Pt verbalizes adequate pain relief with scheduled Ofirmev and PCA Dilaudid. Pt tolerating clear liquids without complaints of n/v. HR 50's sinus jorge with right bbb. See EKG in chart. Blood pressure has been normotensive. Epi gtt infusing at 0.5 mcg as ordered. CI >2. SVO2 has daniel in the 70's. Urine output has been marginal, averaging 20-30 ml/hr. CVP 14-16. PA pressures 30-40's. POC discussed at length with pt and wife at bedside. Both verbalized understanding. See Epic for complete assessment.

## 2025-01-14 NOTE — PROGRESS NOTES
[facility]   part of [facility]     Cardiology Progress Note        Wander Castro Patient Status:  Inpatient    1980 MRN [id number]   Location [facility] 6NE-A Attending Sancho Fairchild*   Hosp Day # 5 PCP No primary care provider on file.         Subjective/ROS:  Overall doing well pod 1. Pain controlled. Breathign comfortable but hard to take deep breath    Objective:  /89   Pulse 62   Temp 98.7 °F (37.1 °C) (Pulmonary Artery)   Resp 11   Ht 5' 10\" (1.778 m)   Wt 197 lb 1.5 oz (89.4 kg)   SpO2 93%   BMI 28.28 kg/m²     Temp (24hrs), Av.8 °F (37.1 °C), Min:96.4 °F (35.8 °C), Max:100.3 °F (37.9 °C)      Tele  sr    Intake/Output:    Intake/Output Summary (Last 24 hours) at 2025 1224  Last data filed at 2025 1200  Gross per 24 hour   Intake 6946 ml   Output 1845 ml   Net 5101 ml       Patient Weight for the past 72 hrs:   Weight   25 0500 186 lb 14.4 oz (84.8 kg)   25 0600 197 lb 1.5 oz (89.4 kg)        Physical Exam:    Gen: alert, oriented x 3, NAD  Heent: pupils equal, reactive. Mucous membranes moist.   Neck: no jvd  Cardiac: regular rate and rhythm, normal S1,S2 + rub  Lungs: diminished juanjo bases  Abd: soft, NT/ND +bs  Ext: mild generalized edema  Skin: Warm, dry  Neuro: No focal deficits    Labs:  Lab Results   Component Value Date    WBC 13.7 2025    HGB 11.1 2025    HCT 33.2 2025    .0 2025    CREATSERUM 0.94 2025    BUN 14 2025     2025    K 4.2 2025     2025    CO2 26.0 2025     2025    CA 8.8 2025    PTT 27.1 2025    INR 1.56 2025    MG 1.9 2025       CXR: Reviewed. Endotracheal and nasogastric tubes removed, other support lines and catheters are stable.  Somewhat decreased inspiratory degree than the prior.  Mild vascular congestion without overt CHF.  Possible small effusion either side.  No sign of    pneumothorax.      Medications:     amiodarone  400 mg Oral TID    Followed by    [START ON 1/16/2025] amiodarone  400 mg Oral Daily    ketorolac  30 mg Intravenous Q6H    [Held by provider] aspirin  81 mg Oral Daily    sennosides  8.6 mg Oral BID    docusate sodium  100 mg Oral BID    metoprolol tartrate  12.5 mg Oral 2x Daily(Beta Blocker)    mupirocin  1 Application Nasal BID    pantoprazole  40 mg Intravenous QAM AC    Or    pantoprazole  40 mg Oral QAM AC    ceFAZolin  2 g Intravenous Q8H    rosuvastatin  20 mg Oral Nightly      insulin regular 1 Units/hr (01/14/25 0715)    sodium chloride 10 mL/hr at 01/13/25 1400    DOBUTamine      sodium chloride 10 mL/hr at 01/14/25 0600    HYDROmorphone in sodium chloride 0.9%      EPINEPHrine (Adrenalin) 5 mg in sodium chloride 0.9% 250 mL infusion 0.5 mcg/min (01/14/25 0400)       Assessment:    Cad presenting with nstemi s/p cabg- pod 1- ef 65%  Hemodynamically stable, off gtts. De-lining  EKG rbbb  Htn- sbp running high, was on norvasc at home   Post op volume overload  Expected anemia   HL-statin    Plan:     Resume bb (held this am), add losartan  IV lasix  Emperic amio per cv  mobilize    Marissa Black NP  [phone]    Seen and examined.MDM per me. Day 1 post CABG doing well. Mild volume overload. Tele tomorrow. Consider Repatha or Leqvio for long term amnagement

## 2025-01-15 LAB
BUN BLD-MCNC: 20 MG/DL (ref 9–23)
CALCIUM BLD-MCNC: 9 MG/DL (ref 8.7–10.6)
CHLORIDE SERPL-SCNC: 108 MMOL/L (ref 98–112)
CO2 SERPL-SCNC: 28 MMOL/L (ref 21–32)
CREAT BLD-MCNC: 0.94 MG/DL
EGFRCR SERPLBLD CKD-EPI 2021: 101 ML/MIN/1.73M2 (ref 60–?)
ERYTHROCYTE [DISTWIDTH] IN BLOOD BY AUTOMATED COUNT: 13.8 %
GLUCOSE BLD-MCNC: 111 MG/DL (ref 70–99)
GLUCOSE BLD-MCNC: 126 MG/DL (ref 70–99)
GLUCOSE BLD-MCNC: 127 MG/DL (ref 70–99)
GLUCOSE BLD-MCNC: 131 MG/DL (ref 70–99)
GLUCOSE BLD-MCNC: 134 MG/DL (ref 70–99)
HCT VFR BLD AUTO: 31.8 %
HGB BLD-MCNC: 10.3 G/DL
MAGNESIUM SERPL-MCNC: 2.1 MG/DL (ref 1.6–2.6)
MCH RBC QN AUTO: 28 PG (ref 26–34)
MCHC RBC AUTO-ENTMCNC: 32.4 G/DL (ref 31–37)
MCV RBC AUTO: 86.4 FL
PLATELET # BLD AUTO: 147 10(3)UL (ref 150–450)
POTASSIUM SERPL-SCNC: 3.9 MMOL/L (ref 3.5–5.1)
RBC # BLD AUTO: 3.68 X10(6)UL
SODIUM SERPL-SCNC: 142 MMOL/L (ref 136–145)
WBC # BLD AUTO: 12.2 X10(3) UL (ref 4–11)

## 2025-01-15 PROCEDURE — 99232 SBSQ HOSP IP/OBS MODERATE 35: CPT | Performed by: STUDENT IN AN ORGANIZED HEALTH CARE EDUCATION/TRAINING PROGRAM

## 2025-01-15 RX ORDER — ACETAMINOPHEN 500 MG
500 TABLET ORAL EVERY 6 HOURS PRN
Status: DISCONTINUED | OUTPATIENT
Start: 2025-01-15 | End: 2025-01-17

## 2025-01-15 RX ORDER — FUROSEMIDE 10 MG/ML
40 INJECTION INTRAMUSCULAR; INTRAVENOUS ONCE
Status: COMPLETED | OUTPATIENT
Start: 2025-01-15 | End: 2025-01-15

## 2025-01-15 RX ORDER — ACETAMINOPHEN 500 MG
1000 TABLET ORAL EVERY 6 HOURS PRN
Status: DISCONTINUED | OUTPATIENT
Start: 2025-01-15 | End: 2025-01-17

## 2025-01-15 NOTE — PROGRESS NOTES
[facility]   part of [facility] [facility] Hospitalist Progress Note     Wander Castro Patient Status:  Inpatient    1980 MRN [id number]   Location [facility] 6NE-A Attending Leslie Hernandes DO   Hosp Day # 6 PCP No primary care provider on file.     Chief Complaint: Chest pain    Subjective:     Patient seen examined at bedside.  Pt with cough, doing well overall. No constipation. On room air.     Objective:    Review of Systems:   A comprehensive review of systems was completed; pertinent positive and negatives stated in subjective.    Vital signs:  Temp:  [97.4 °F (36.3 °C)-99.2 °F (37.3 °C)] 97.4 °F (36.3 °C)  Pulse:  [63-94] 72  Resp:  [9-25] 23  BP: (109-147)/(27-99) 117/65  SpO2:  [90 %-96 %] 91 %    Physical Exam:    General: No acute distress  Respiratory: No wheezes, no rhonchi  Cardiovascular: S1, S2, regular rate and rhythm  Abdomen: Soft, Non-tender, non-distended, positive bowel sounds  Neuro: No new focal deficits.   Extremities: No edema      Diagnostic Data:    Labs:  Recent Labs   Lab 25  0509 25  0818 01/10/25  0311 25  0414 25  1204 25  1444 25  0409 01/15/25  0522   WBC 8.0 9.6  --  8.1  --  17.6* 13.7* 12.2*   HGB 15.4 14.8  --  13.2  --  11.2* 11.1* 10.3*   MCV 84.4 82.6  --  84.0  --  86.3 84.7 86.4   .0 200.0   < > 192.0  192.0 153.0 148.0* 159.0 147.0*   INR 1.11  --   --   --  1.65* 1.56*  --   --     < > = values in this interval not displayed.       Recent Labs   Lab 25  0509 25  0818 25  1444 25  0409 01/15/25  0522   *   < > 188* 128* 127*   BUN 14   < > 13 14 20   CREATSERUM 1.00   < > 1.06 0.94 0.94   CA 10.5*   < > 8.0* 8.8 9.0   ALB 4.4  --   --   --   --       < > 148* 140 142   K 3.8   < > 3.2* 4.2 3.9      < > 112 111 108   CO2 28.0   < > 20.0* 26.0 28.0   ALKPHO 55  --   --   --   --    AST 18  --   --   --   --    ALT 23  --   --   --   --    BILT 0.8  --   --   --   --    TP  7.6  --   --   --   --     < > = values in this interval not displayed.       Estimated Glomerular Filtration Rate: 101.2 mL/min/1.73m2 (by CKD-EPI based on SCr of 0.94 mg/dL).    Recent Labs   Lab 01/11/25  0559 01/12/25  0414 01/13/25  0447   TROPHS 2,125* 1,343* 3,719*       Recent Labs   Lab 01/09/25  0509 01/13/25  1204 01/13/25  1444   PTP 14.4 19.7* 18.8*   INR 1.11 1.65* 1.56*          Microbiology    No results found for this visit on 01/09/25.      Imaging: Reviewed in Epic.    Medications:    amiodarone  400 mg Oral TID    Followed by    [START ON 1/16/2025] amiodarone  400 mg Oral Daily    insulin aspart  1-10 Units Subcutaneous TID AC and HS    metoprolol tartrate  12.5 mg Oral 2x Daily(Beta Blocker)    losartan  25 mg Oral Daily    aspirin  81 mg Oral Daily    sennosides  8.6 mg Oral BID    docusate sodium  100 mg Oral BID    mupirocin  1 Application Nasal BID    pantoprazole  40 mg Intravenous QAM AC    Or    pantoprazole  40 mg Oral QAM AC    rosuvastatin  20 mg Oral Nightly       Assessment & Plan:      #STEMI S/P 3 vessel CABG  -Multivessel disease on cath  -ASA, Statin    # Hypertension  BB, ARB    # Hyperlipidemia  -Continue statin therapy.    Shira Mack MD    Supplementary Documentation:     Quality:  DVT Mechanical Prophylaxis: NICOLAS hose, SCDs,    DVT Pharmacologic Prophylaxis   Medication   None      DVT Pharmacologic prophylaxis: Aspirin 162 mg         Code Status: Not on file  Dunn: No urinary catheter in place  Dunn Duration (in days):   Central line:    RICHARD:     Discharge is dependent on: Clinical improvement  At this point Mr. Castro is expected to be discharge to: Home    The 21st Century Cures Act makes medical notes like these available to patients in the interest of transparency. Please be advised this is a medical document. Medical documents are intended to carry relevant information, facts as evident, and the clinical opinion of the practitioner. The medical note is intended as  peer to peer communication and may appear blunt or direct. It is written in medical language and may contain abbreviations or verbiage that are unfamiliar.

## 2025-01-15 NOTE — PROGRESS NOTES
[facility]   part of [facility]     Cardiology Progress Note        Wander Castro Patient Status:  Inpatient    1980 MRN [id number]   Location [facility] 6NE-A Attending Shira Mack MD   Hosp Day # 6 PCP No primary care provider on file.         Subjective/ROS:  No chest pain or shortness of breath.      Objective:  /65   Pulse 72   Temp 97.4 °F (36.3 °C) (Temporal)   Resp 23   Ht 5' 10\" (1.778 m)   Wt 197 lb 1.5 oz (89.4 kg)   SpO2 91%   BMI 28.28 kg/m²     Temp (24hrs), Av °F (36.7 °C), Min:97.4 °F (36.3 °C), Max:99.2 °F (37.3 °C)      Tele  sr    Intake/Output:    Intake/Output Summary (Last 24 hours) at 1/15/2025 1244  Last data filed at 2025  Gross per 24 hour   Intake 360 ml   Output 550 ml   Net -190 ml       Patient Weight for the past 72 hrs:   Weight   25 0500 186 lb 14.4 oz (84.8 kg)   25 0600 197 lb 1.5 oz (89.4 kg)        Physical Exam:    Gen: alert, oriented x 3, NAD  Heent: pupils equal, reactive. Mucous membranes moist.   Neck: no jvd  Cardiac: regular rate and rhythm, normal S1,S2 + rub  Lungs: diminished   Abd: soft, NT/ND +bs  Ext: +edema  Skin: Warm, dry  Neuro: No focal deficits    Labs:  Lab Results   Component Value Date    WBC 12.2 01/15/2025    HGB 10.3 01/15/2025    HCT 31.8 01/15/2025    .0 01/15/2025    CREATSERUM 0.94 01/15/2025    BUN 20 01/15/2025     01/15/2025    K 3.9 01/15/2025     01/15/2025    CO2 28.0 01/15/2025     01/15/2025    CA 9.0 01/15/2025    MG 2.1 01/15/2025           Medications:     amiodarone  400 mg Oral TID    Followed by    [START ON 2025] amiodarone  400 mg Oral Daily    insulin aspart  1-10 Units Subcutaneous TID AC and HS    metoprolol tartrate  12.5 mg Oral 2x Daily(Beta Blocker)    losartan  25 mg Oral Daily    aspirin  81 mg Oral Daily    sennosides  8.6 mg Oral BID    docusate sodium  100 mg Oral BID    mupirocin  1 Application Nasal BID    pantoprazole  40  mg Intravenous QAM AC    Or    pantoprazole  40 mg Oral QAM AC    rosuvastatin  20 mg Oral Nightly      DOBUTamine         Assessment:    Cad presenting with nstemi s/p cabg- pod 2- ef 65%  Htn-better controlled on med adjustments   Post op volume overload- diuresis as needed  Expected anemia, tcp  HL-statin    Plan:     Continue asa, bb, statin  Emperic amio per cvs  Ok for tele    Marissa Black, MARGARITA  [phone]    Seen and examined.MDM per me. Day 2 post CABG with noormal LV. To tele. Looks great. Eventually consider Repatha and Leqvio

## 2025-01-15 NOTE — PLAN OF CARE
Pt received sitting up in the chair. Pt ambulated the length of the unit; tolerated well. O2 sat remained >92% on room air with ambulation. Pain well controlled with Norco. Pt has rested comfortably overnight. HR 60's NSR. Blood pressure 110's-120's systolic.

## 2025-01-15 NOTE — PLAN OF CARE
Received pt. Resting in bed. Up to chair early into shift and ambulating halls x3 with ease. VSS on RA. Pain control with tylenol see MAR. Lasix x1. Wife at bedside, updated on plan of care. Pt. With orders to tx out of ICU, report called to accepting RN. Pt. Transported to new room assignment with belongings in hand via transport services.

## 2025-01-15 NOTE — PROGRESS NOTES
[facility]   part of [facility]     CV Surgery Progress Note    Wander Castro Patient Status:  Inpatient    1980 MRN [id number]   Location [facility] 6NE-A Attending Sancho Fairchild*   Hosp Day # 6 PCP No primary care provider on file.     Subjective:  Patient seen and examined by Dr. Juares. Patient reports feeling well. Pain is controlled. Denies any dyspnea. Ambulating well.       Objective:  /81 (BP Location: Right arm)   Pulse 66   Temp 97.4 °F (36.3 °C) (Temporal)   Resp 12   Ht 5' 10\" (1.778 m)   Wt 197 lb 1.5 oz (89.4 kg)   SpO2 92%   BMI 28.28 kg/m²     Intake/Output:    Intake/Output Summary (Last 24 hours) at 1/15/2025 0905  Last data filed at 2025  Gross per 24 hour   Intake 840 ml   Output 990 ml   Net -150 ml       Labs:  Lab Results   Component Value Date    WBC 12.2 01/15/2025    RBC 3.68 01/15/2025    HGB 10.3 01/15/2025    HCT 31.8 01/15/2025    MCV 86.4 01/15/2025    MCH 28.0 01/15/2025    MCHC 32.4 01/15/2025    RDW 13.8 01/15/2025    .0 01/15/2025     Lab Results   Component Value Date     01/15/2025    K 3.9 01/15/2025     01/15/2025    CO2 28.0 01/15/2025    BUN 20 01/15/2025    CREATSERUM 0.94 01/15/2025     01/15/2025    CA 9.0 01/15/2025     Lab Results   Component Value Date    INR 1.56 (H) 2025    INR 1.65 (H) 2025    INR 1.11 2025         Physical Exam:  General: VSS, A&Ox3, In NAD  Neck: No JVD  Heart: RRR. Sternum Stable   Extremities: warm, dry, generalized edema   Neuro: no focal deficits     Assessment/Plan:   S/P CABGx3 with LIMA-LAD, SVG-Diagonal and OM, amputation of SAMARA POD#2  -Hypertension, controlled on po meds  -PO amio for Afib ppx   -Leukocytosis, low grade temp, likely reactive, down trending- monitor   -Acute post op blood loss anemia, expected, asymptomatic- monitor   -Mild vol OL, weight up from baseline- re-dose lasix today   -Renal function intact, good UOP- monitor   -Bowel  regimen   -Pain management, prn norco   -Chest tubes removed  -Keep PW for now   -GI PPX: protonix  -DVT PPX: TEDs/SCDs  -Encourage IS/ambulation   -PT/OT/Cardiac rehab   -Ok to transfer to CTU     -D/W Dr. Cooper/Blanquita Barros PA-C   Cardiothoracic Surgery   1/15/2025  9:05 AM

## 2025-01-15 NOTE — OPERATIVE REPORT
OPERATIVE REPORT    DATE OF PROCEDURE: 1/13/2025    PREOPERATIVE DIAGNOSIS:  Severe multivessel coronary artery disease  NSTEMI  HLD  HTN    POSTOPERATIVE DIAGNOSIS:  Same     PROCEDURES:    1.) Transesophageal echocardiogram  2.) Coronary artery bypass grafting x3 (LIMA to LA, reverse SVG to diagonal, reverse SVG to OM)  3.) Left leg endovein harvest  4.) Sternal plating  5.) left atrial appendage excision    SURGEON: Ronal Cooper MD  PA: Dorian Johns PA-C    The PA helped with vein harvest, exposure, following sutures and closing the chest.    ANESTHESIA: General endotracheal anesthesia  ESTIMATED BLOOD LOSS: On cardiopulmonary bypass and cell saver  IV FLUIDS: see anesthesia record    DRAINS:  36 Serbian anterior mediastinal and left pleural  36 Serbian anterior mediastinal and right pleural    SPECIMEN: None  CARDIOPULMONARY BYPASS: 141 minutes  CROSS-CLAMP: 123 minutes    FINDINGS: Note to have mild hypokinesis of lateral wall pre surgery which improved after revascularization. OM was quite diminutive and only had target in one possible location. Performed sternal plating for body habitus.    INDICATION: Wander Castro is a 44 year old male with history of HTN and HLD who presented with NSTEMI and found to have severe, multivessel coronary artery disease with a  left ventricular ejection fraction of 60%. The patient was counseled on and understand the potential risks, alternatives, and benefits associated with coronary artery bypass graft and and elected to proceed with surgery.    OPERATION IN DETAIL: A timeout procedure was performed confirming the correct patient, surgical site, and procedure. The patient underwent uneventful general endotracheal anesthesia and invasive hemodynamic monitoring lines were placed. The neck, chest, abdomen, groins, and legs were prepped and draped in the usual sterile fashion and appropriate perioperative antibiotics were administered.    A median sternotomy incision was performed  and the left internal mammary artery was harvested in a pedicled fashion while the left greater saphenous vein was harvested endoscopically. After administering 5000 units of heparin, the distal end of the left internal mammary artery was divided and demonstrated pulsatile flow. A clip was placed on the end and the conduit soaked in a ray nando saturated with papaverine solution.    A sternal retractor was inserted and a pericardial well was created. Two pledgeted 3-0 ethibond suture were placed in the distal ascending aorta in a pursestring fashion. A full 300 units per kilogram of heparin was administered. Patient was cannulated for cardiopulmonary bypass using the high ascending aorta via Seldinger technique and venous drainage was accomplished via a two-stage cannula in the right atrial appendage. An antegrade cardioplegia catheter was placed in the proximal ascending aorta to provide antegrade cardioplegia and root ventilation. When a satisfactory activating clotting time greater than 400 was confirmed, cardiopulmonary bypass was initiated, and the patient was cooled to 32 degrees. The aortic cross-clamp was applied with the heart being actively fibrillated and cardioplegic arrest was initiated with antegrade cardioplegia while ice was placed on the heart, yielding satisfactory diastolic arrest. The cardioplegia was regularly re-dosed throughout the procedure.    The heart was rotated and let atrial appendage exposed. It was excised. No clot was noted. The stump  was over sewn with double layer 3-0 prolene.     The OM coronary artery was then identified and incised. It was extremely difficult to visualize posterior at the take off from the circumflex. The vessel was approximately 1 millimeters in size and of good quality but diminutive immediately to the target. A reverse saphenous vein graft was anastomosed in a running fashion with 7-0 Prolene suture then probed proximally and distally prior to being secured.  The graft was gently distended and found to be widely patent and hemostatic. Antegrade cardioplegia was given and the heart filled. The appropriate length for the graft was measured and the vein trimmed and spatulated.     Next the heart was rotated and a suitable location on the diagonal coronary artery was then identified and incised. The vessel was approximately 1.5 millimeters in size and of good quality.  Reverse saphenous vein graft was anastomosed in a running fashion with 7-0 Prolene suture then probed proximally and distally prior to being secured.  Antegrade cardioplegia was given and the heart filled. The appropriate length for the graft was measured and the vein trimmed and spatulated.     Next a slit in the pericardium was made with electrocautery taking care not to injure the left phrenic nerve and the LIMA was brought into the pericardial well. Its length was confirmed and a suitable site on the LAD identified. The LAD was opened then the arteriotomy was extended with Vann scissors and was approximately 1.5 millimeters in size and of good quality. The LIMA was then trimmed to the appropriate length, a bulldog placed at the base, and the end spatulated. We then performed the distal LIMA anastomosis using a running 8-0 Prolene in an end-to-side fashion then probed proximally and distally prior to being secured. We then tested the anastomosis by temporarily removing the bulldog and confirmed hemostasis. A partial 6-0 Prolene suture was used to tack the mammary to the epicardial surface.    We then performed the proximal anastomoses.  The heart was drained and an aortotomy was made with an #11 blade scalpel and aortic punch. The OM graft anastomosis was performed with a running #6-0 Prolene. This was repeated for the diagonal graft and the aorta was de-aired prior to securing the suture.    A hot shot was then given, the patient rewarmed, and the bulldog removed from the LIMA. Ventricular pacing wires  were then placed along the diaphragmatic surface of the heart and tunneled through and secured to the skin. A 36 Emirati straight chest tube was placed in the anterior mediastinum and right pleural space while a 36 Emir[place] right angle chest tube was placed in the anterior mediastinum and left pleural space with each being tunneled through and secured to the skin. Transesophageal echocardiography was then used to confirm adequate de-airing and good ventricular function. Strong biphasic doppler signals were noted in each graft. he antegrade cannula was then removed and its pursestring was secured. After all operative hemostasis was ensured, the patient was weaned from cardiopulmonary bypass. The venous cannula was removed and its pursestring was secured. Protamine was administered and the pump suckers immediately removed prior to protamine reaching the patient. The remaining volume from the pump reservoir was bagged to be given back to the patient via cell saver. The arterial cannula was removed and its pursestring was secured. The field was then re-inspected for hemostasis, and the pericardial well was taken down. The pericardium was closed.    The chest was closed with stainless steel wires. After ensuring hemostasis, the sternum was then re-approximated, and the sternal wires were tightened, secured, and turned down. Two sternal plates were placed over the manubrium and the sternal body respectively. The wound was then irrigated with antibiotic saline solution. The pre-sternal fascia was re-approximated with a running #0 Vicryl suture. The deep dermis was re-approximated with a running #2-0 Vicryl suture. The skin was re-approximated with a #3-0 Vicryl suture in a running, subcuticular fashion. The wound was cleaned and sealed with steri strips and a sterile dressing.    All counts were correct. The patient was subsequently transferred to the cardiothoracic surgery intensive care unit still intubated and sedated with  stable hemodynamics and fluid resuscitation requirements.       Ronal Cooper MD  [facility]

## 2025-01-15 NOTE — PAYOR COMM NOTE
--------------  CONTINUED STAY REVIEW    Payor: [place] (NON CONTRACTED [employer])  Subscriber #:  [id number]  Authorization Number: [employer]    Admit date: 1/9/25  Admit time:  8:05 AM    PLEASE FAX APPROVAL FOR DAYS. THANK YOU.    1/14/25         Temp:  [96.4 °F (35.8 °C)-100.3 °F (37.9 °C)] 97.9 °F (36.6 °C)  Pulse:  [53-69] 66  Resp:  [8-22] 13  BP: ()/(56-95) 155/85  SpO2:  [92 %-99 %] 93 %  FiO2 (%):  [40 %-50 %] 40 %     Respiratory: No wheezes, no rhonchi  Cardiovascular: S1, S2, regular rate and rhythm  Abdomen: Soft, Non-tender, non-distended, positive bowel sounds  Neuro: No new focal deficits.   Extremities: No edema     Lab 01/09/25  0509 01/09/25  0818 01/11/25  0559 01/12/25  0414 01/13/25  1204 01/13/25  1444 01/14/25  0409   WBC 8.0 9.6  --  8.1  --  17.6* 13.7*   HGB 15.4 14.8  --  13.2  --  11.2* 11.1*   MCV 84.4 82.6  --  84.0  --  86.3 84.7   .0 200.0 163.0 192.0  192.0 153.0 148.0* 159.0   INR 1.11  --   --   --  1.65* 1.56*  --      Lab 01/09/25  0509 01/12/25  0414 01/13/25  1444 01/14/25  0409   * 106* 188* 128*   BUN 14 12 13 14   CREATSERUM 1.00 0.86 1.06 0.94   CA 10.5* 9.4 8.0* 8.8   ALB 4.4  --   --   --     142 148* 140   K 3.8 3.9 3.2* 4.2    110 112 111   CO2 28.0 25.0 20.0* 26.0   ALKPHO 55  --   --   --    AST 18  --   --   --    ALT 23  --   --   --    BILT 0.8  --   --   --    TP 7.6  --   --   --      Lab 01/11/25  0559 01/12/25  0414 01/13/25  0447   TROPHS 2,125* 1,343* 3,719*      Lab 01/09/25  0509 01/13/25  1204 01/13/25  1444   PTP 14.4 19.7* 18.8*   INR 1.11 1.65* 1.56*       Medications:    amiodarone  400 mg Oral TID     Followed by    [START ON 1/16/2025] amiodarone  400 mg Oral Daily    ketorolac  30 mg Intravenous Q6H    insulin aspart  1-10 Units Subcutaneous TID AC and HS    [Held by provider] aspirin  81 mg Oral Daily    sennosides  8.6 mg Oral BID    docusate sodium  100 mg Oral BID    metoprolol tartrate  12.5 mg Oral 2x  Daily(Beta Blocker)    mupirocin  1 Application Nasal BID    pantoprazole  40 mg Intravenous QAM AC     Or    pantoprazole  40 mg Oral QAM AC    ceFAZolin  2 g Intravenous Q8H    rosuvastatin  20 mg Oral Nightly         Assessment & Plan:       #STEMI S/P 3 vessel CABG  - Off insulin drip  - SSI started this am.  -Multivessel disease on cath  -Plan is for three-vessel bypass today  -Cardiology and cardiothoracic surgery following     # Hypertension  -Blood pressure meds on hold with patient being on nitro drip.     # Hyperlipidemia  -Continue statin therapy.                      1/15/25      Temp:  [97.4 °F (36.3 °C)-99.2 °F (37.3 °C)] 97.4 °F (36.3 °C)  Pulse:  [63-94] 72  Resp:  [9-25] 23  BP: (109-147)/(27-99) 117/65  SpO2:  [90 %-96 %] 91 %     Respiratory: No wheezes, no rhonchi  Cardiovascular: S1, S2, regular rate and rhythm  Abdomen: Soft, Non-tender, non-distended, positive bowel sounds  Neuro: No new focal deficits.   Extremities: No edema     Lab 01/09/25  0509 01/09/25  0818 01/12/25  0414 01/13/25  1204 01/13/25  1444 01/14/25  0409 01/15/25  0522   WBC 8.0 9.6 8.1  --  17.6* 13.7* 12.2*   HGB 15.4 14.8 13.2  --  11.2* 11.1* 10.3*   MCV 84.4 82.6 84.0  --  86.3 84.7 86.4   .0 200.0 192.0  192.0 153.0 148.0* 159.0 147.0*   INR 1.11  --   --  1.65* 1.56*  --   --      Lab 01/09/25  0509 01/13/25  1444 01/14/25  0409 01/15/25  0522   * 188* 128* 127*   BUN 14 13 14 20   CREATSERUM 1.00 1.06 0.94 0.94   CA 10.5* 8.0* 8.8 9.0   ALB 4.4  --   --   --     148* 140 142   K 3.8 3.2* 4.2 3.9    112 111 108   CO2 28.0 20.0* 26.0 28.0   ALKPHO 55  --   --   --    AST 18  --   --   --    ALT 23  --   --   --    BILT 0.8  --   --   --    TP 7.6  --   --   --      Lab 01/11/25  0559 01/12/25  0414 01/13/25  0447   TROPHS 2,125* 1,343* 3,719*      Lab 01/09/25  0509 01/13/25  1204 01/13/25  1444   PTP 14.4 19.7* 18.8*   INR 1.11 1.65* 1.56*    Medications:    amiodarone  400 mg Oral TID      Followed by    [START ON 1/16/2025] amiodarone  400 mg Oral Daily    insulin aspart  1-10 Units Subcutaneous TID AC and HS    metoprolol tartrate  12.5 mg Oral 2x Daily(Beta Blocker)    losartan  25 mg Oral Daily    aspirin  81 mg Oral Daily    sennosides  8.6 mg Oral BID    docusate sodium  100 mg Oral BID    mupirocin  1 Application Nasal BID    pantoprazole  40 mg Intravenous QAM AC     Or    pantoprazole  40 mg Oral QAM AC    rosuvastatin  20 mg Oral Nightly         Assessment & Plan:       #STEMI S/P 3 vessel CABG  -Multivessel disease on cath  -ASA, Statin     # Hypertension  BB, ARB     # Hyperlipidemia  -Continue statin therapy.                    MEDICATIONS ADMINISTERED IN LAST 1 DAY:  acetaminophen (Tylenol Extra Strength) tab 500 mg       Date Action Dose Route User    1/15/2025 1026 Given 500 mg Oral Fidelia Carbone RN          amiodarone (Pacerone) tab 400 mg       Date Action Dose Route User    1/15/2025 1440 Given 400 mg Oral Fidelia Carbone RN    1/15/2025 0816 Given 400 mg Oral Fidelia Carbone RN    1/14/2025 2128 Given 400 mg Oral Jen Munoz RN          aspirin chewable tab 81 mg       Date Action Dose Route User    1/15/2025 0816 Given 81 mg Oral Fidelia Carbone RN          ceFAZolin (Ancef) 2g in 10mL IV syringe premix       Date Action Dose Route User    1/15/2025 0259 New Bag 2 g Intravenous Jen Munoz RN          docusate sodium (Colace) cap 100 mg       Date Action Dose Route User    1/15/2025 0816 Given 100 mg Oral Fidelia Carbone RN    1/14/2025 2128 Given 100 mg Oral Jen Munoz RN          furosemide (Lasix) 10 mg/mL injection 40 mg       Date Action Dose Route User    1/15/2025 1440 Given 40 mg Intravenous Fidelia Carbone RN          HYDROcodone-acetaminophen (Norco) 5-325 MG per tab 1 tablet       Date Action Dose Route User    1/14/2025 2128 Given 1 tablet Oral Jen Munoz RN          ketorolac (Toradol) 30 MG/ML injection 30 mg       Date Action Dose  Route User    1/14/2025 2127 Given 30 mg Intravenous Jen Munoz RN          losartan (Cozaar) tab 25 mg       Date Action Dose Route User    1/15/2025 0816 Given 25 mg Oral Fdielia Carbone RN          metoclopramide (Reglan) tab 10 mg       Date Action Dose Route User    1/15/2025 0646 Given 10 mg Oral Jen Munoz RN          metoclopramide (Reglan) 5 mg/mL injection 10 mg       Date Action Dose Route User    1/14/2025 1700 Given 10 mg Intravenous Kary Zhong RN          metoclopramide (Reglan) 5 mg/mL injection       Date Action Dose Route User    1/14/2025 1700 Given 10 mg Intravenous Kary Zhong RN          metoprolol tartrate (Lopressor) partial tab 12.5 mg       Date Action Dose Route User    1/15/2025 0646 Given 12.5 mg Oral Jen Munoz RN          mupirocin (Bactroban) 2% nasal ointment 1 Application       Date Action Dose Route User    1/15/2025 0816 Given 1 Application Nasal (Bilateral Nares) Fideila Carbone RN    1/14/2025 2128 Given 1 Application Nasal (Bilateral Nares) Jen Munoz RN          pantoprazole (Protonix) DR tab 40 mg       Date Action Dose Route User    1/15/2025 0646 Given 40 mg Oral Jen Munoz RN          rosuvastatin (Crestor) tab 20 mg       Date Action Dose Route User    1/14/2025 2128 Given 20 mg Oral Jen Munoz RN          sennosides (Senokot) tab 8.6 mg       Date Action Dose Route User    1/15/2025 0816 Given 8.6 mg Oral Fideila Carbone RN    1/14/2025 2128 Given 8.6 mg Oral Jen Munoz RN            Vitals (last day)       Date/Time Temp Pulse Resp BP SpO2 Weight O2 Device O2 Flow Rate (L/min) [facility]    01/15/25 1400 -- 76 17 138/72 94 % -- -- --     01/15/25 1300 -- 72 23 129/106 93 % -- -- --     01/15/25 1200 99 °F (37.2 °C) 72 23 117/65 91 % -- None (Room air) --     01/15/25 1100 -- 94 21 109/73 96 % -- -- --     01/15/25 1000 -- 77 14 131/69 93 % -- -- --     01/15/25 0900 -- 82 23 133/74 92  % -- -- --     01/15/25 0800 97.4 °F (36.3 °C) 66 12 118/81 92 % -- None (Room air) --     01/15/25 0000 97.8 °F (36.6 °C) 65 11 126/77 92 % -- None (Room air) -- SD    01/14/25 2000 99.2 °F (37.3 °C) 74 17 136/72 94 % -- None (Room air) -- SD    01/14/25 1200 97.9 °F (36.6 °C) 66 13 155/85 93 % -- Nasal cannula 2 L/min     01/14/25 1100 -- 62 11 153/89 93 % -- -- --     01/14/25 1000 -- 64 13 155/95 95 % -- -- --     01/14/25 0900 -- 62 22 128/76 98 % -- -- --     01/14/25 0800 98.7 °F (37.1 °C) 59 15 117/76 99 % -- Nasal cannula 2 L/min     01/14/25 0700 98.8 °F (37.1 °C) 60 20 117/73 -- -- -- -- SD    01/14/25 0600 99.7 °F (37.6 °C) 56 8 122/69 99 % 197 lb 1.5 oz (89.4 kg) -- -- SD    01/14/25 0500 98.9 °F (37.2 °C) 55 10 121/74 99 % -- -- -- SD    01/14/25 0400 99 °F (37.2 °C) 55 13 113/72 99 % -- Nasal cannula 2 L/min SD    01/14/25 0300 99 °F (37.2 °C) 55 11 114/69 -- -- -- -- SD    01/14/25 0200 99.4 °F (37.4 °C) 58 13 115/69 99 % -- -- -- SD    01/14/25 0100 99.7 °F (37.6 °C) 59 15 111/71 99 % -- -- -- SD    01/14/25 0000 100.1 °F (37.8 °C) 61 15 110/68 99 % -- Nasal cannula 2 L/min SD       Blood Transfusion Record       Product Unit Status Volume Start End            Transfuse fresh frozen plasma       24  [id number]  [id number] Completed 01/13/25 1414 239 mL 01/13/25 1338 01/13/25 1348       24  [id number]  [id number] Completed 01/13/25 1414 217 mL 01/13/25 1332 01/13/25 1342                Transfuse platelets       24  [id number]  [id number] Completed 01/13/25 1414 187 mL 01/13/25 1303 01/13/25 1313

## 2025-01-15 NOTE — OCCUPATIONAL THERAPY NOTE
OCCUPATIONAL THERAPY EVALUATION - INPATIENT     Room Number: 6602/6602-A  Evaluation Date: 1/14/2025  Type of Evaluation: Initial  Presenting Problem: NSTEMI s/p CABG    Physician Order: IP Consult to Occupational Therapy  Reason for Therapy: ADL/IADL Dysfunction and Discharge Planning    OCCUPATIONAL THERAPY ASSESSMENT   Patient is currently functioning below baseline with selfcare and functional mobility. Prior to admission, patient's baseline is independent.  Patient is requiring minimum assistance as a result of the following impairments: decreased functional reach, decreased endurance, pain, and difficulty maintaining precautions. Occupational Therapy will continue to follow for duration of hospitalization.    Patient will benefit from continued skilled OT Services for duration of hospitalization, however, given the patient is functioning near baseline level do not anticipate skilled therapy needs at discharge     History Related to Current Admission: Patient is a 44 year old male admitted on 1/9/2025 with Presenting Problem: NSTEMI s/p CABG. Co-Morbidities : HTN/HLD    Recommendations for nursing staff:   Transfers: one assist and  walker  Toileting location: toilet    EVALUATION SESSION    Patient Start of Session: supine    FUNCTIONAL TRANSFER ASSESSMENT  Sit to Stand: Edge of Bed; Chair  Edge of Bed: Minimal Assist  Chair: Minimal Assist    BED MOBILITY  Rolling: Supervision  Supine to Sit : Minimal Assist  Sit to Supine (OT): Not Tested  Scooting: min A    BALANCE ASSESSMENT  Static Sitting: Modified Independent  Static Standing: Stand-by Assist    FUNCTIONAL ADL ASSESSMENT  Eating: Modified Independent  Grooming Standing: Modified Independent  LB Dressing Seated: Moderate Assist    ACTIVITY TOLERANCE:   Pulse: 72  Heart Rate Source: Monitor                   O2 SATURATIONS       Cognition  WFL    Upper extremity  WFL    EDUCATION PROVIDED  Patient Education : Role of Occupational Therapy; Plan of Care;  DME Recommendations; Functional Transfer Techniques; Fall Prevention; Surgical Precautions; Posture/Positioning; Edema Reduction; Energy Conservation; Proper Body Mechanics  Patient's Response to Education: Verbalized Understanding; Requires Further Education    Equipment used: RW  Demonstrates functional use; do not anticipate long term use of walker    Therapist comments: Patient educated on OT role s/p CABG, sternal precautions, log roll technique, adaptive techniques, pacing, compensatory strategies and possible equipment needs.  Patient also introduced to CVOR binder ex.        Patient End of Session: Up in chair;Needs met;Call light within reach;RN aware of session/findings;All patient questions and concerns addressed;Discussed recommendations with /;Hospital anti-slip socks    OCCUPATIONAL PROFILE    HOME SITUATION  Type of Home: House  Home Layout: Two level  Lives With: Spouse (2 kids 9 and 12)    Toilet and Equipment: Standard height toilet  Shower/Tub and Equipment: Walk-in shower  Other Equipment: None    Occupation/Status: HS  and sports   Hand Dominance: Right  Drives: Yes  Patient Regularly Uses: None    Prior Level of Function: independent with all aDLs and functional mobility    SUBJECTIVE   \"I like playing basketball.\"    PAIN ASSESSMENT  Rating: 3  Location: surgical  Management Techniques: Nurse notified    OBJECTIVE  Precautions: Sternal;Cardiac  Fall Risk: High fall risk    WEIGHT BEARING RESTRICTION       ASSESSMENTS    AM-PAC '6-Clicks' Inpatient Daily Activity Short Form  -   Putting on and taking off regular lower body clothing?: A Little  -   Bathing (including washing, rinsing, drying)?: A Little  -   Toileting, which includes using toilet, bedpan or urinal? : A Little  -   Putting on and taking off regular upper body clothing?: A Little  -   Taking care of personal grooming such as brushing teeth?: A Little  -   Eating meals?: A Little    AM-PAC  Score:  Score: 18  Approx Degree of Impairment: 46.65%  Standardized Score (AM-PAC Scale): 38.66    PLAN  OT Device Recommendations: TBD  OT Treatment Plan: Balance activities;Energy conservation/work simplification techniques;ADL training;Functional transfer training;UE strengthening/ROM;Endurance training;Patient/Family education;Patient/Family training;Cognitive reorientation;Equipment eval/education;Compensatory technique education  Rehab Potential : Good  Frequency: 3-5x/week  Number of Visits to Meet Established Goals: 7    ADL Goals   Patient will perform lower body dressing:  with modified independent  Patient will perform toileting: with modified independent     Functional Transfer Goals  Patient will transfer to toilet:  with modified independent     ADDITIONAL GOALS  Pt will independently uphold precautions during morning routine with no cues.   Pt will stand at the sink x 5 minutes during self-care with 1 rest break modified independent.  Pt will independently verbalize 3 energy conservation techniques that can be incorporated into ADLs with no cues.         Patient Evaluation Complexity Level:   Occupational Profile/Medical History LOW - Brief history including review of medical or therapy records    Specific performance deficits impacting engagement in ADL/IADL LOW  1 - 3 performance deficits    Client Assessment/Performance Deficits LOW - No comorbidities nor modifications of tasks    Clinical Decision Making LOW - Analysis of occupational profile, problem-focused assessments, limited treatment options    Overall Complexity LOW     OT Session Time: 30 minutes  Self-Care Home Management: 15 minutes

## 2025-01-15 NOTE — CM/SW NOTE
Spoke with patient's insurance NELIA Avila--patient in [place] with [facility]--pending acceptance    NELIA Avila  [phone]

## 2025-01-15 NOTE — CM/SW NOTE
Call received from [facility]--able to accept patient @ discharge--reserved in [facility]    [facility] @ discharge  Phone  [phone]  Fax  [phone]    AVS needs to be faxed to [facility] agency @ discharge

## 2025-01-16 LAB
ANION GAP SERPL CALC-SCNC: 7 MMOL/L (ref 0–18)
BUN BLD-MCNC: 21 MG/DL (ref 9–23)
CALCIUM BLD-MCNC: 9.2 MG/DL (ref 8.7–10.6)
CHLORIDE SERPL-SCNC: 106 MMOL/L (ref 98–112)
CO2 SERPL-SCNC: 28 MMOL/L (ref 21–32)
CREAT BLD-MCNC: 0.84 MG/DL
EGFRCR SERPLBLD CKD-EPI 2021: 109 ML/MIN/1.73M2 (ref 60–?)
ERYTHROCYTE [DISTWIDTH] IN BLOOD BY AUTOMATED COUNT: 13.7 %
GLUCOSE BLD-MCNC: 105 MG/DL (ref 70–99)
GLUCOSE BLD-MCNC: 113 MG/DL (ref 70–99)
GLUCOSE BLD-MCNC: 113 MG/DL (ref 70–99)
GLUCOSE BLD-MCNC: 161 MG/DL (ref 70–99)
GLUCOSE BLD-MCNC: 95 MG/DL (ref 70–99)
HCT VFR BLD AUTO: 32.2 %
HGB BLD-MCNC: 10.5 G/DL
MCH RBC QN AUTO: 28.1 PG (ref 26–34)
MCHC RBC AUTO-ENTMCNC: 32.6 G/DL (ref 31–37)
MCV RBC AUTO: 86.1 FL
OSMOLALITY SERPL CALC.SUM OF ELEC: 295 MOSM/KG (ref 275–295)
PLATELET # BLD AUTO: 162 10(3)UL (ref 150–450)
POTASSIUM SERPL-SCNC: 3.8 MMOL/L (ref 3.5–5.1)
RBC # BLD AUTO: 3.74 X10(6)UL
SODIUM SERPL-SCNC: 141 MMOL/L (ref 136–145)
WBC # BLD AUTO: 10.5 X10(3) UL (ref 4–11)

## 2025-01-16 PROCEDURE — 99232 SBSQ HOSP IP/OBS MODERATE 35: CPT | Performed by: STUDENT IN AN ORGANIZED HEALTH CARE EDUCATION/TRAINING PROGRAM

## 2025-01-16 RX ORDER — FUROSEMIDE 10 MG/ML
40 INJECTION INTRAMUSCULAR; INTRAVENOUS
Status: DISCONTINUED | OUTPATIENT
Start: 2025-01-16 | End: 2025-01-17

## 2025-01-16 RX ORDER — POTASSIUM CHLORIDE 1500 MG/1
40 TABLET, EXTENDED RELEASE ORAL ONCE
Status: COMPLETED | OUTPATIENT
Start: 2025-01-16 | End: 2025-01-16

## 2025-01-16 NOTE — PROGRESS NOTES
[facility]   part of [facility]     Hospitalist Progress Note     Wander Castro Patient Status:  Inpatient    1980 MRN [id number]   Location [facility] 6NE-A Attending Leslie Hernandes DO   Hosp Day # 7 PCP No primary care provider on file.     Chief Complaint: Chest pain    Subjective:     Patient walked 2x this AM, fatigued    Objective:    Review of Systems:   A comprehensive review of systems was completed; pertinent positive and negatives stated in subjective.    Vital signs:  Temp:  [97.5 °F (36.4 °C)-100 °F (37.8 °C)] 98.6 °F (37 °C)  Pulse:  [63-80] 70  Resp:  [12-23] 20  BP: (113-145)/() 118/72  SpO2:  [93 %-99 %] 96 %    Physical Exam:    General: No acute distress  Respiratory: No wheezes, no rhonchi  Cardiovascular: S1, S2, regular rate and rhythm  Abdomen: Soft, Non-tender, non-distended, positive bowel sounds  Neuro: No new focal deficits.   Extremities: No edema      Diagnostic Data:    Labs:  Recent Labs   Lab 25  0414 25  1204 25  1444 25  0409 01/15/25  0522 25  0615   WBC 8.1  --  17.6* 13.7* 12.2* 10.5   HGB 13.2  --  11.2* 11.1* 10.3* 10.5*   MCV 84.0  --  86.3 84.7 86.4 86.1   .0  192.0 153.0 148.0* 159.0 147.0* 162.0   INR  --  1.65* 1.56*  --   --   --        Recent Labs   Lab 25  0409 01/15/25  0522 25  0615   * 127* 105*   BUN 14 20 21   CREATSERUM 0.94 0.94 0.84   CA 8.8 9.0 9.2    142 141   K 4.2 3.9 3.8    108 106   CO2 26.0 28.0 28.0       Estimated Glomerular Filtration Rate: 108.9 mL/min/1.73m2 (by CKD-EPI based on SCr of 0.84 mg/dL).    Recent Labs   Lab 25  0559 25  0414 25  0447   TROPHS 2,125* 1,343* 3,719*       Recent Labs   Lab 25  1204 25  1444   PTP 19.7* 18.8*   INR 1.65* 1.56*          Microbiology    No results found for this visit on 25.      Imaging: Reviewed in Epic.    Medications:    furosemide  40 mg Intravenous BID (Diuretic)     amiodarone  400 mg Oral Daily    insulin aspart  1-10 Units Subcutaneous TID AC and HS    metoprolol tartrate  12.5 mg Oral 2x Daily(Beta Blocker)    losartan  25 mg Oral Daily    aspirin  81 mg Oral Daily    sennosides  8.6 mg Oral BID    docusate sodium  100 mg Oral BID    mupirocin  1 Application Nasal BID    pantoprazole  40 mg Intravenous QAM AC    Or    pantoprazole  40 mg Oral QAM AC    rosuvastatin  20 mg Oral Nightly       Assessment & Plan:      #STEMI S/P 3 vessel CABG  -Multivessel disease on cath  -ASA, Statin    # Hypertension  BB, ARB    # Hyperlipidemia  -Continue statin therapy.    Shira Mack MD    Supplementary Documentation:     Quality:  DVT Mechanical Prophylaxis: NICOLAS hose, KWAKUs,    DVT Pharmacologic Prophylaxis   Medication   None      DVT Pharmacologic prophylaxis: Aspirin 162 mg         Code Status: Not on file  Dunn: No urinary catheter in place  Dunn Duration (in days):   Central line:    RICHARD: 1/17/2025    Discharge is dependent on: Clinical improvement  At this point Mr. Castro is expected to be discharge to: Home    The 21st Century Cures Act makes medical notes like these available to patients in the interest of transparency. Please be advised this is a medical document. Medical documents are intended to carry relevant information, facts as evident, and the clinical opinion of the practitioner. The medical note is intended as peer to peer communication and may appear blunt or direct. It is written in medical language and may contain abbreviations or verbiage that are unfamiliar.

## 2025-01-16 NOTE — PLAN OF CARE
POD #3 from CABG x3. A&Ox4. O2 sat WNL on RA. SR on tele. PO amio. Continent of bladder and bowel. NO BM yet, PRN medications given. Midsternal incision and L leg incisions CDI. No drainage. PW taped to chest. Walking the halls and tolerating well. Encouraging independence. 1 norco given for pain control- with relief.       Problem: Patient/Family Goals  Goal: Patient/Family Long Term Goal  Description: Patient's Long Term Goal: Have successful CABG and recovery    Interventions:  -   - See additional Care Plan goals for specific interventions  Outcome: Progressing  Goal: Patient/Family Short Term Goal  Description: Patient's Short Term Goal: be chest pain free    Interventions:   -   - See additional Care Plan goals for specific interventions  Outcome: Progressing     Problem: CARDIOVASCULAR - ADULT  Goal: Maintains optimal cardiac output and hemodynamic stability  Description: INTERVENTIONS:  - Monitor vital signs, rhythm, and trends  - Monitor for bleeding, hypotension and signs of decreased cardiac output  - Evaluate effectiveness of vasoactive medications to optimize hemodynamic stability  - Monitor arterial and/or venous puncture sites for bleeding and/or hematoma  - Assess quality of pulses, skin color and temperature  - Assess for signs of decreased coronary artery perfusion - ex. Angina  - Evaluate fluid balance, assess for edema, trend weights  Outcome: Progressing  Goal: Absence of cardiac arrhythmias or at baseline  Description: INTERVENTIONS:  - Continuous cardiac monitoring, monitor vital signs, obtain 12 lead EKG if indicated  - Evaluate effectiveness of antiarrhythmic and heart rate control medications as ordered  - Initiate emergency measures for life threatening arrhythmias  - Monitor electrolytes and administer replacement therapy as ordered  Outcome: Progressing     Problem: METABOLIC/FLUID AND ELECTROLYTES - ADULT  Goal: Electrolytes maintained within normal limits  Description:  INTERVENTIONS:  - Monitor labs and rhythm and assess patient for signs and symptoms of electrolyte imbalances  - Administer electrolyte replacement as ordered  - Monitor response to electrolyte replacements, including rhythm and repeat lab results as appropriate  - Fluid restriction as ordered  - Instruct patient on fluid and nutrition restrictions as appropriate  Outcome: Progressing  Goal: Hemodynamic stability and optimal renal function maintained  Description: INTERVENTIONS:  - Monitor labs and assess for signs and symptoms of volume excess or deficit  - Monitor intake, output and patient weight  - Monitor urine specific gravity, serum osmolarity and serum sodium as indicated or ordered  - Monitor response to interventions for patient's volume status, including labs, urine output, blood pressure (other measures as available)  - Encourage oral intake as appropriate  - Instruct patient on fluid and nutrition restrictions as appropriate  Outcome: Progressing     Problem: HEMATOLOGIC - ADULT  Goal: Maintains hematologic stability  Description: INTERVENTIONS  - Assess for signs and symptoms of bleeding or hemorrhage  - Monitor labs and vital signs for trends  - Administer supportive blood products/factors, fluids and medications as ordered and appropriate  - Administer supportive blood products/factors as ordered and appropriate  Outcome: Progressing     Problem: RESPIRATORY - ADULT  Goal: Achieves optimal ventilation and oxygenation  Description: INTERVENTIONS:  - Assess for changes in respiratory status  - Assess for changes in mentation and behavior  - Position to facilitate oxygenation and minimize respiratory effort  - Oxygen supplementation based on oxygen saturation or ABGs  - Provide Smoking Cessation handout, if applicable  - Encourage broncho-pulmonary hygiene including cough, deep breathe, Incentive Spirometry  - Assess the need for suctioning and perform as needed  - Assess and instruct to report SOB or  any respiratory difficulty  - Respiratory Therapy support as indicated  - Manage/alleviate anxiety  - Monitor for signs/symptoms of CO2 retention  Outcome: Progressing

## 2025-01-16 NOTE — PROGRESS NOTES
[facility]   part of [facility]     CV Surgery Progress Note    Wander Castro Patient Status:  Inpatient    1980 MRN [id number]   Location [facility] 6NE-A Attending Sancho Fairchild*   Hosp Day # 7 PCP No primary care provider on file.     Subjective:  Patient reports feeling good. Pain is controlled. Denies any dyspnea. Ambulating well.       Objective:  /72 (BP Location: Right arm)   Pulse 70   Temp 98.6 °F (37 °C) (Oral)   Resp 20   Ht 5' 10\" (1.778 m)   Wt 197 lb (89.4 kg)   SpO2 96%   BMI 28.27 kg/m²     Intake/Output:    Intake/Output Summary (Last 24 hours) at 2025 1245  Last data filed at 2025 1046  Gross per 24 hour   Intake 1080 ml   Output 1850 ml   Net -770 ml       Labs:  Lab Results   Component Value Date    WBC 10.5 2025    RBC 3.74 2025    HGB 10.5 2025    HCT 32.2 2025    MCV 86.1 2025    MCH 28.1 2025    MCHC 32.6 2025    RDW 13.7 2025    .0 2025     Lab Results   Component Value Date     2025    K 3.8 2025     2025    CO2 28.0 2025    BUN 21 2025    CREATSERUM 0.84 2025     2025    CA 9.2 2025     Lab Results   Component Value Date    INR 1.56 (H) 2025    INR 1.65 (H) 2025    INR 1.11 2025         Physical Exam:  General: VSS, A&Ox3, In NAD  Neck: No JVD  Heart: RRR. Sternum Stable   Extremities: warm, dry, no edema   Neuro: no focal deficits     Assessment/Plan:   S/P CABGx3 with LIMA-LAD, SVG-Diagonal and OM, amputation of SAMARA POD#3  -Hypertension, controlled on po meds  -NSTEMI, can start plavix tomorrow   -PO amio for Afib ppx   -Leukocytosis, low grade temp, likely reactive, resolved- monitor   -Acute post op blood loss anemia, expected, stable- monitor   -Mild vol OL, weight up from baseline- continue lasix   -Renal function intact, I/O inaccurate- monitor   -Bowel regimen   -Pain management, prn  norco/tylenol  -Chest tubes removed  -Discontinue PW  -GI PPX: protonix  -DVT PPX: TEDs/SCDs  -Encourage IS/ambulation   -PT/OT/Cardiac rehab   -Anticipate discharge tomorrow      -D/W Dr. Cooper/Blanquita Barros PA-C   Cardiothoracic Surgery   1/16/2025  12:45 PM

## 2025-01-16 NOTE — PLAN OF CARE
Assume care of patient at change of shift, sitting up in chair. AO x4. NSR w/ BBB on tele monitor. O2 sat adequate on RA. Denies chest pain and shortness of breath. Needs encouragement with incentive spirometer. Plan of care updated. Chair locked. Call light and personal items in reach. All needs mets.    POC:  - ambulate QID. Do stairs today  - Incentive spirometer 10x every hour  - probable discharge Friday    Problem: Patient/Family Goals  Goal: Patient/Family Long Term Goal  Description: Patient's Long Term Goal: Have successful CABG and recovery  Interventions:  - ambulate as directed  - use incentive spirometer 10x per hour  - See additional Care Plan goals for specific interventions  Outcome: Progressing  Goal: Patient/Family Short Term Goal  Description: Patient's Short Term Goal: go home  Interventions:   - meds, labs, tele monitoring  - pain control  - See additional Care Plan goals for specific interventions  Outcome: Progressing     Problem: CARDIOVASCULAR - ADULT  Goal: Maintains optimal cardiac output and hemodynamic stability  Description: INTERVENTIONS:  - Monitor vital signs, rhythm, and trends  - Monitor for bleeding, hypotension and signs of decreased cardiac output  - Evaluate effectiveness of vasoactive medications to optimize hemodynamic stability  - Monitor arterial and/or venous puncture sites for bleeding and/or hematoma  - Assess quality of pulses, skin color and temperature  - Assess for signs of decreased coronary artery perfusion - ex. Angina  - Evaluate fluid balance, assess for edema, trend weights  Outcome: Progressing  Goal: Absence of cardiac arrhythmias or at baseline  Description: INTERVENTIONS:  - Continuous cardiac monitoring, monitor vital signs, obtain 12 lead EKG if indicated  - Evaluate effectiveness of antiarrhythmic and heart rate control medications as ordered  - Initiate emergency measures for life threatening arrhythmias  - Monitor electrolytes and administer  replacement therapy as ordered  Outcome: Progressing     Problem: METABOLIC/FLUID AND ELECTROLYTES - ADULT  Goal: Electrolytes maintained within normal limits  Description: INTERVENTIONS:  - Monitor labs and rhythm and assess patient for signs and symptoms of electrolyte imbalances  - Administer electrolyte replacement as ordered  - Monitor response to electrolyte replacements, including rhythm and repeat lab results as appropriate  - Fluid restriction as ordered  - Instruct patient on fluid and nutrition restrictions as appropriate  Outcome: Progressing  Goal: Hemodynamic stability and optimal renal function maintained  Description: INTERVENTIONS:  - Monitor labs and assess for signs and symptoms of volume excess or deficit  - Monitor intake, output and patient weight  - Monitor urine specific gravity, serum osmolarity and serum sodium as indicated or ordered  - Monitor response to interventions for patient's volume status, including labs, urine output, blood pressure (other measures as available)  - Encourage oral intake as appropriate  - Instruct patient on fluid and nutrition restrictions as appropriate  Outcome: Progressing     Problem: HEMATOLOGIC - ADULT  Goal: Maintains hematologic stability  Description: INTERVENTIONS  - Assess for signs and symptoms of bleeding or hemorrhage  - Monitor labs and vital signs for trends  - Administer supportive blood products/factors, fluids and medications as ordered and appropriate  - Administer supportive blood products/factors as ordered and appropriate  Outcome: Progressing     Problem: RESPIRATORY - ADULT  Goal: Achieves optimal ventilation and oxygenation  Description: INTERVENTIONS:  - Assess for changes in respiratory status  - Assess for changes in mentation and behavior  - Position to facilitate oxygenation and minimize respiratory effort  - Oxygen supplementation based on oxygen saturation or ABGs  - Provide Smoking Cessation handout, if applicable  - Encourage  broncho-pulmonary hygiene including cough, deep breathe, Incentive Spirometry  - Assess the need for suctioning and perform as needed  - Assess and instruct to report SOB or any respiratory difficulty  - Respiratory Therapy support as indicated  - Manage/alleviate anxiety  - Monitor for signs/symptoms of CO2 retention  Outcome: Progressing

## 2025-01-16 NOTE — PHYSICAL THERAPY NOTE
PHYSICAL THERAPY TREATMENT NOTE - INPATIENT    Room Number: 8615/8615-A     Session: 1      Presenting Problem: NSTEMI  Co-Morbidities : HTN/HLD  History related to current admission: Patient is a 44 year old male admitted on 1/9/2025: Presented with chest pain while he was helping his dtr train for basketball in the garage dx NSTEMI, s/p LHC 1/9, s/p CABG x3 1/13     HOME SITUATION  Type of Home: House  Home Layout: Two level       Lives With: Spouse (2 kids 9 and 12)    Drives: Yes   Patient Regularly Uses: None       Prior Level of [place]: indep, highly active, is a teacher and coaches boys [employer], was hiking with his family at [employer] and playing basketball with his dtrs prior to admit   PHYSICAL THERAPY ASSESSMENT   Patient demonstrates good  progress this session, goals   MET .      Patient is currently functioning near baseline with bed mobility, transfers, gait, and stair negotiation.    Patient currently does not meet criteria for skilled inpatient physical therapy services, however patient will continue to benefit from QID ambulation with nursing staff.  Pt is discharged from IP PT services.  RN aware to re-order if there is a decline in mobility status.      Given the patient is functioning near baseline level do not anticipate skilled therapy needs at discharge .     PLAN DURING HOSPITALIZATION  Nursing Mobility Recommendation : 1 Assist  PT Device Recommendation: Rolling walker           CURRENT GOALS     Goal #1 Patient is able to demonstrate supine - sit EOB @ level: supervision      Goal #2 Patient is able to demonstrate transfers EOB to/from Chair/Wheelchair at assistance level: supervision      Goal #3 Patient is able to ambulate 150 feet with assist device: walker - rolling at assistance level: supervision      Goal #4 Patient will navigate stairs with rail and SBA   Goal #5 Patient will participate in CV binder HEP    Goal #6     Goal Comments: Goals established on 1/14/2025       1/16/2025 all goals achieved     SUBJECTIVE  \"Yeah, I'm tired\"    OBJECTIVE  Precautions: Sternal;Cardiac    WEIGHT BEARING RESTRICTION     PAIN ASSESSMENT   Rating: Unable to rate  Location: chest incision  Management Techniques: Activity promotion;Relaxation;Repositioning    BALANCE                                                                                                                       Static Sitting: Good  Dynamic Sitting: Good           Static Standing: Good  Dynamic Standing: Fair +    ACTIVITY TOLERANCE                         O2 WALK       AM-PAC '6-Clicks' INPATIENT SHORT FORM - BASIC MOBILITY  How much difficulty does the patient currently have...  Patient Difficulty: Turning over in bed (including adjusting bedclothes, sheets and blankets)?: None   Patient Difficulty: Sitting down on and standing up from a chair with arms (e.g., wheelchair, bedside commode, etc.): A Little   Patient Difficulty: Moving from lying on back to sitting on the side of the bed?: None   How much help from another person does the patient currently need...   Help from Another: Moving to and from a bed to a chair (including a wheelchair)?: A Little   Help from Another: Need to walk in hospital room?: A Little   Help from Another: Climbing 3-5 steps with a railing?: A Little     AM-PAC Score:  Raw Score: 20   Approx Degree of Impairment: 35.83%   Standardized Score (AM-PAC Scale): 47.67   CMS Modifier (G-Code): CJ    FUNCTIONAL ABILITY STATUS  Gait Assessment   Functional Mobility/Gait Assessment  Gait Assistance: Independent  Distance (ft): 275  Assistive Device: None  Pattern: Within Functional Limits  Stairs: Stairs  How Many Stairs: 10  Device: 1 Rail  Assist: Supervision  Pattern: Ascend and Descend  Ascend and Descend : Step to    Skilled Therapy Provided: Per RN okay to work with pt. Pt received ambulating in [place] and was agreeable to PT session.     Bed Mobility: Pt educated on log roll and verbalized  understanding, declined attempting.  Rolling: NT   Supine<>Sit: NT   Sit<>Supine: NT     Transfer Mobility:  Sit<>Stand: supervision   Stand<>Sit: supervision   Gait: pt ambulated with no AD independently. Encouraged pt to relax BUE and initiate arm swing    Therapist's Comments: Pt educated on CV binder HEP, therapist demonstrated each one and pt verbalized understanding and denied questions. Pt declined attempting at this time.       Patient End of Session: Up in chair;Needs met;Call light within reach;RN aware of session/findings;All patient questions and concerns addressed;Hospital anti-slip socks;Alarm set;Family present    PT Session Time: 23 minutes  Gait Training: 15 minutes  Therapeutic Exercise: 8 minutes

## 2025-01-16 NOTE — PROGRESS NOTES
Rec'd [facility] at ~1800. A&O x 4. Tele shows NSR. O2 sats adequate on RA. Pt continent, up w/ SBA. No C/O pain or SOB. Sternal and LLE donor sites x 3 CDI w/ steri-strips in place. Chest tube site dressing remains in place, to be removed tomorrow. Pacer wires intact and taped to pt's abdomen. Bed locked and in low position, call light and personal items within reach. Will continue to monitor. POC - PW to be removed tomorrrow, poss dc home Friday per CV surg.

## 2025-01-16 NOTE — CARDIAC REHAB
Cardiac rehab education initiated with patient  Will continue education with patient's wife when she arrives

## 2025-01-16 NOTE — CARDIAC REHAB
Cardiac rehab education continued with patient and wife  Patient referred to cardiac rehab  Patient to check with insurance--[employer] did not allow me to schedule an outpatient appointment with his BCBS plan.

## 2025-01-16 NOTE — PROGRESS NOTES
Progress Note  Wander Castro Patient Status:  Inpatient    1980 MRN [id number]   Location [facility] 8NE-A Attending Shira Mack MD   Hosp Day # 7 PCP No primary care provider on file.     Subjective:  No complaints of chest pain or SOB. 1000 ml on IS    Objective:  /74   Pulse 76   Temp 100 °F (37.8 °C) (Oral)   Resp 20   Ht 5' 10\" (1.778 m)   Wt 197 lb (89.4 kg)   SpO2 97%   BMI 28.27 kg/m²     Telemetry: SR      Intake/Output: +37362    Intake/Output Summary (Last 24 hours) at 2025 1122  Last data filed at 2025 1046  Gross per 24 hour   Intake 1080 ml   Output 1850 ml   Net -770 ml       Last 3 Weights   25 0550 197 lb (89.4 kg)   25 0600 197 lb 1.5 oz (89.4 kg)   25 0500 186 lb 14.4 oz (84.8 kg)   25 1551 190 lb 0.6 oz (86.2 kg)   25 0506 190 lb (86.2 kg)       Labs:  Recent Labs   Lab 25  0409 01/15/25  0522 25  0615   * 127* 105*   BUN 14 20 21   CREATSERUM 0.94 0.94 0.84   EGFRCR 101 101 109   CA 8.8 9.0 9.2    142 141   K 4.2 3.9 3.8    108 106   CO2 26.0 28.0 28.0     Recent Labs   Lab 25  0409 01/15/25  0522 25  0615   RBC 3.92* 3.68* 3.74*   HGB 11.1* 10.3* 10.5*   HCT 33.2* 31.8* 32.2*   MCV 84.7 86.4 86.1   MCH 28.3 28.0 28.1   MCHC 33.4 32.4 32.6   RDW 13.6 13.8 13.7   NEPRELIM 11.10*  --   --    WBC 13.7* 12.2* 10.5   .0 147.0* 162.0         Recent Labs   Lab 25  0559 25  0414 25  0447   TROPHS 2,125* 1,343* 3,719*     Lab Results   Component Value Date    INR 1.56 (H) 2025    INR 1.65 (H) 2025    INR 1.11 2025       Diagnostics:     Review of Systems   Constitutional: Negative.   Cardiovascular: Negative.    Respiratory: Negative.         Physical Exam:    Gen: Alert, oriented x 3, in no apparent distress  Heent: Pupils equal, reactive. Mucous membranes moist.   Cardiac: Regular rate and rhythm, normal S1,S2  Lungs: diminished at the bases  Abd:  Soft, non tender, non distended  Ext: No edema  Skin: Warm, dry  Neuro: No focal deficits        Medications:     furosemide  40 mg Intravenous BID (Diuretic)    amiodarone  400 mg Oral Daily    insulin aspart  1-10 Units Subcutaneous TID AC and HS    metoprolol tartrate  12.5 mg Oral 2x Daily(Beta Blocker)    losartan  25 mg Oral Daily    aspirin  81 mg Oral Daily    sennosides  8.6 mg Oral BID    docusate sodium  100 mg Oral BID    mupirocin  1 Application Nasal BID    pantoprazole  40 mg Intravenous QAM AC    Or    pantoprazole  40 mg Oral QAM AC    rosuvastatin  20 mg Oral Nightly             Assessment:  MV CAD, NSTEMI on presentation  S/p CABG 3 V (LIMA to LA, reverse SVG to diagonal, reverse SVG to OM) on 1/13/25  On ASA, BB, ARB, statin, emperic amiodarone  Normal LVEF 65%  Mild post operative volume overload  Diurese with IV lasix. Up 13 liters and 7 pounds   HTN - 122/74  On BB, ARB  Hyperlipidemia -     Plan:  Continue ASA, BB, statin   Emperic amiodarone  Continue IV lasix. Follow I/o, weights, BMP  Encourage ambulation, IS    Plan of care discussed with patient, RN.    KIKI Hudson  1/16/2025  11:22 AM    I have personally seen and examined patient.   I have independently formulated assessment and plan  I agree with the AP note    Doing well post bypass.  Has been ambulating.  Currently on medical therapy for CAD.  Continue with aspirin, beta-blockers and statin.  Started on empiric amiodarone.  Doing well.  Anticipate discharge tomorrow.    Thank you for the opportunity to participate in the care of your patient.     Sanchez Juarez MD  Interventional Cardiologist  [facility]

## 2025-01-16 NOTE — DIETARY NOTE
[facility]   part of [facility]   CLINICAL NUTRITION    [facility]     Admitting diagnosis:  NSTEMI (non-ST elevated myocardial infarction) (HCC) [I21.4]    Ht: 177.8 cm (5' 10\")  Wt: 89.4 kg (197 lb).   Body mass index is 28.27 kg/m².  IBW: 75.4 kg    Wt Readings from Last 6 Encounters:   01/16/25 89.4 kg (197 lb)        Labs/Meds reviewed    Diet:       Procedures    Regular/General diet Texture Consistency: Regular; Is Patient on Accuchecks? Yes; Misc Restriction: Cardiac     Percent Meals Eaten (last 3 days)       Date/Time Percent Meals Eaten (%)    01/14/25 0800 100 %    01/14/25 1200 50 %    01/15/25 2000 100 %    01/16/25 0936 90 %            Consulted for heart heathy education. Discussed diet principles/guidelines and cooking tips  Pt and family receptive and verbalizing understanding. Provided handouts. Expect good compliance after discharge.    Patient is at low nutrition risk at this time.    Please consult if patient status changes or nutrition issues arise.    Lucinda Pagan RDN, LDN, [facility]  Clinical Dietitian   [phone]

## 2025-01-17 VITALS
HEIGHT: 70 IN | RESPIRATION RATE: 18 BRPM | WEIGHT: 192 LBS | OXYGEN SATURATION: 97 % | HEART RATE: 74 BPM | SYSTOLIC BLOOD PRESSURE: 120 MMHG | BODY MASS INDEX: 27.49 KG/M2 | TEMPERATURE: 98 F | DIASTOLIC BLOOD PRESSURE: 71 MMHG

## 2025-01-17 LAB
GLUCOSE BLD-MCNC: 102 MG/DL (ref 70–99)
GLUCOSE BLD-MCNC: 111 MG/DL (ref 70–99)
POTASSIUM SERPL-SCNC: 3.9 MMOL/L (ref 3.5–5.1)

## 2025-01-17 PROCEDURE — 99239 HOSP IP/OBS DSCHRG MGMT >30: CPT | Performed by: STUDENT IN AN ORGANIZED HEALTH CARE EDUCATION/TRAINING PROGRAM

## 2025-01-17 RX ORDER — FUROSEMIDE 20 MG/1
40 TABLET ORAL DAILY
Qty: 10 TABLET | Refills: 1 | Status: SHIPPED | OUTPATIENT
Start: 2025-01-17

## 2025-01-17 RX ORDER — HYDROCODONE BITARTRATE AND ACETAMINOPHEN 5; 325 MG/1; MG/1
1 TABLET ORAL EVERY 6 HOURS PRN
Qty: 30 TABLET | Refills: 0 | Status: SHIPPED | OUTPATIENT
Start: 2025-01-17

## 2025-01-17 RX ORDER — FUROSEMIDE 20 MG/1
40 TABLET ORAL DAILY
Qty: 5 TABLET | Refills: 1 | Status: SHIPPED | OUTPATIENT
Start: 2025-01-17 | End: 2025-01-17

## 2025-01-17 RX ORDER — ASPIRIN 81 MG/1
81 TABLET, CHEWABLE ORAL DAILY
Qty: 30 TABLET | Refills: 11 | Status: SHIPPED | OUTPATIENT
Start: 2025-01-17

## 2025-01-17 RX ORDER — CLOPIDOGREL BISULFATE 75 MG/1
75 TABLET ORAL DAILY
Status: DISCONTINUED | OUTPATIENT
Start: 2025-01-18 | End: 2025-01-17

## 2025-01-17 RX ORDER — METOPROLOL SUCCINATE 25 MG/1
25 TABLET, EXTENDED RELEASE ORAL DAILY
Qty: 30 TABLET | Refills: 2 | Status: SHIPPED | OUTPATIENT
Start: 2025-01-17

## 2025-01-17 RX ORDER — ROSUVASTATIN CALCIUM 20 MG/1
20 TABLET, COATED ORAL NIGHTLY
Qty: 30 TABLET | Refills: 2 | Status: SHIPPED | OUTPATIENT
Start: 2025-01-17

## 2025-01-17 RX ORDER — CLOPIDOGREL BISULFATE 75 MG/1
75 TABLET ORAL DAILY
Qty: 30 TABLET | Refills: 1 | Status: SHIPPED | OUTPATIENT
Start: 2025-01-18

## 2025-01-17 RX ORDER — LOSARTAN POTASSIUM 25 MG/1
25 TABLET ORAL DAILY
Qty: 30 TABLET | Refills: 2 | Status: SHIPPED | OUTPATIENT
Start: 2025-01-17

## 2025-01-17 RX ORDER — FUROSEMIDE 20 MG/1
20 TABLET ORAL DAILY
Qty: 10 TABLET | Refills: 0 | Status: SHIPPED | OUTPATIENT
Start: 2025-01-17 | End: 2025-01-17

## 2025-01-17 RX ORDER — AMIODARONE HYDROCHLORIDE 200 MG/1
TABLET ORAL
Qty: 37 TABLET | Refills: 0 | Status: SHIPPED | OUTPATIENT
Start: 2025-01-17 | End: 2025-02-16

## 2025-01-17 NOTE — PROGRESS NOTES
[facility]   part of [facility]     CV Surgery Progress Note    Wander Castro Patient Status:  Inpatient    1980 MRN [id number]   Location [facility] 6NE-A Attending Sancho Fairchild*   Hosp Day # 8 PCP No primary care provider on file.     Subjective:  Patient reports feeling good, ready to go home. Pain is controlled. Ambulating well.       Objective:  /75 (BP Location: Right arm)   Pulse 63   Temp 98.4 °F (36.9 °C) (Oral)   Resp 18   Ht 5' 10\" (1.778 m)   Wt 192 lb 0.3 oz (87.1 kg)   SpO2 98%   BMI 27.55 kg/m²     Intake/Output:    Intake/Output Summary (Last 24 hours) at 2025 0957  Last data filed at 2025 0848  Gross per 24 hour   Intake 1740 ml   Output 4100 ml   Net -2360 ml       Labs:       Lab Results   Component Value Date    K 3.9 2025     Lab Results   Component Value Date    INR 1.56 (H) 2025    INR 1.65 (H) 2025    INR 1.11 2025         Physical Exam:  General: VSS, A&Ox3, In NAD  Neck: No JVD  Heart: RRR. Sternum Stable   Lungs: diminished right base   Extremities: warm, dry, no edema   Neuro: no focal deficits     Assessment/Plan:   S/P CABGx3 with LIMA-LAD, SVG-Diagonal and OM, amputation of SAMARA POD#4  -Hypertension, controlled on po meds  -NSTEMI, plavix started   -PO amio for Afib ppx x1 month  -TOS, asymptomatic- can follow up with vascular surgery as outpatient   -Leukocytosis, low grade temp, likely reactive, resolved- monitor   -Acute post op blood loss anemia, expected, stable- monitor   -Mild vol OL, weight up from baseline- po lasix at discharge   -Renal function intact, I/O inaccurate- monitor   -Bowel regimen   -Pain management, prn norco/tylenol  -Chest tubes and PW removed  -GI PPX: protonix  -DVT PPX: TEDs/SCDs  -Encourage IS/ambulation   -PT/OT/Cardiac rehab   -Ok to discharge home today from surgical standpoint if ok with other services      -D/W Dr. Cooper/Blanquita Barros PA-C   Cardiothoracic  Surgery   1/17/2025  10:00 AM

## 2025-01-17 NOTE — PLAN OF CARE
Patient is alert & oriented x4. Pt can ambulate Ad tabby. Breath sounds are clear bilateral. On room air. Normal sinus rhythm. Mild midsternal pain reported. Midsternal and x3 LLE incision steri-strip intact, treated w/ betadine. Bed in low position and call light within reach. Reviewed plan of care and patient verbalizes understanding.     POC: Ambulate 4 times daily/ Expected discharge on 1/17.       Problem: Patient/Family Goals  Goal: Patient/Family Long Term Goal  Description: Patient's Long Term Goal: Have successful CABG and recovery  Interventions:  - ambulate as directed  - use incentive spirometer 10x per hour  - See additional Care Plan goals for specific interventions  Outcome: Progressing  Goal: Patient/Family Short Term Goal  Description: Patient's Short Term Goal: go home  Interventions:   - meds, labs, tele monitoring  - pain control  - See additional Care Plan goals for specific interventions  Outcome: Progressing     Problem: CARDIOVASCULAR - ADULT  Goal: Maintains optimal cardiac output and hemodynamic stability  Description: INTERVENTIONS:  - Monitor vital signs, rhythm, and trends  - Monitor for bleeding, hypotension and signs of decreased cardiac output  - Evaluate effectiveness of vasoactive medications to optimize hemodynamic stability  - Monitor arterial and/or venous puncture sites for bleeding and/or hematoma  - Assess quality of pulses, skin color and temperature  - Assess for signs of decreased coronary artery perfusion - ex. Angina  - Evaluate fluid balance, assess for edema, trend weights  Outcome: Progressing  Goal: Absence of cardiac arrhythmias or at baseline  Description: INTERVENTIONS:  - Continuous cardiac monitoring, monitor vital signs, obtain 12 lead EKG if indicated  - Evaluate effectiveness of antiarrhythmic and heart rate control medications as ordered  - Initiate emergency measures for life threatening arrhythmias  - Monitor electrolytes and administer replacement therapy as  ordered  Outcome: Progressing     Problem: METABOLIC/FLUID AND ELECTROLYTES - ADULT  Goal: Electrolytes maintained within normal limits  Description: INTERVENTIONS:  - Monitor labs and rhythm and assess patient for signs and symptoms of electrolyte imbalances  - Administer electrolyte replacement as ordered  - Monitor response to electrolyte replacements, including rhythm and repeat lab results as appropriate  - Fluid restriction as ordered  - Instruct patient on fluid and nutrition restrictions as appropriate  Outcome: Progressing  Goal: Hemodynamic stability and optimal renal function maintained  Description: INTERVENTIONS:  - Monitor labs and assess for signs and symptoms of volume excess or deficit  - Monitor intake, output and patient weight  - Monitor urine specific gravity, serum osmolarity and serum sodium as indicated or ordered  - Monitor response to interventions for patient's volume status, including labs, urine output, blood pressure (other measures as available)  - Encourage oral intake as appropriate  - Instruct patient on fluid and nutrition restrictions as appropriate  Outcome: Progressing     Problem: HEMATOLOGIC - ADULT  Goal: Maintains hematologic stability  Description: INTERVENTIONS  - Assess for signs and symptoms of bleeding or hemorrhage  - Monitor labs and vital signs for trends  - Administer supportive blood products/factors, fluids and medications as ordered and appropriate  - Administer supportive blood products/factors as ordered and appropriate  Outcome: Progressing     Problem: RESPIRATORY - ADULT  Goal: Achieves optimal ventilation and oxygenation  Description: INTERVENTIONS:  - Assess for changes in respiratory status  - Assess for changes in mentation and behavior  - Position to facilitate oxygenation and minimize respiratory effort  - Oxygen supplementation based on oxygen saturation or ABGs  - Provide Smoking Cessation handout, if applicable  - Encourage broncho-pulmonary hygiene  including cough, deep breathe, Incentive Spirometry  - Assess the need for suctioning and perform as needed  - Assess and instruct to report SOB or any respiratory difficulty  - Respiratory Therapy support as indicated  - Manage/alleviate anxiety  - Monitor for signs/symptoms of CO2 retention  Outcome: Progressing

## 2025-01-17 NOTE — DISCHARGE SUMMARY
[place] HOSPITALIST  DISCHARGE SUMMARY     Wander Castro Patient Status:  Inpatient    1980 MRN [id number]   Location [facility] 8NE-A Attending No att. providers found   Hosp Day # 8 PCP No primary care provider on file.     Date of Admission: 2025  Date of Discharge:  2025     Discharge Disposition: Home Health Care Services    Discharge Diagnosis:  #STEMI S/P 3 vessel CABG  -Multivessel disease on cath  -ASA, Statin     # Hypertension  BB, ARB     # Hyperlipidemia  -Continue statin therapy.    History of Present Illness:   Wander Castro is a 44 year old male with PMHx HTN/HLD who presented to the hospital for chest pain. His pain started yesterday when he as helping train his daughter in the garage for basketball. It was a pressure sensation in his substernal region rated 2-6/10 which was decreased with nitroglycerin and had no exacerbating factors. He notes feeling winded and lightheaded and went and sat down before it resolved. He then woke up at 4 AM with the pain again and decided to seek medical eval. He denied any prior stress test but notes a family history of an MI in his father in his 40's.       Brief Synopsis:   Pt admitted with STEMi s/p 3 v CABG  DC home in stable condition    Lace+ Score: 59  59-90 High Risk  29-58 Medium Risk  0-28   Low Risk  Patient was referred to the [facility].    TCM Follow-Up Recommendation:  LACE > 58: High Risk of readmission after discharge from the hospital.      Procedures during hospitalization:   CABG     Incidental or significant findings and recommendations (brief descriptions):  no    Lab/Test results pending at Discharge:   no    Consultants:  SIGIFREDO Calderon     Discharge Medication List:     Discharge Medications        START taking these medications        Instructions Prescription details   amiodarone 200 MG Tabs  Commonly known as: Pacerone  Start taking on: 2025      Take 2 tablets (400 mg total) by mouth daily  for 7 days, THEN 1 tablet (200 mg total) daily for 23 days.   Stop taking on: February 16, 2025  Quantity: 37 tablet  Refills: 0     aspirin 81 MG Chew      Chew 1 tablet (81 mg total) by mouth daily.   Quantity: 30 tablet  Refills: 11     clopidogrel 75 MG Tabs  Commonly known as: Plavix  Start taking on: January 18, 2025      Take 1 tablet (75 mg total) by mouth daily.   Quantity: 30 tablet  Refills: 1     furosemide 20 MG Tabs  Commonly known as: Lasix      Take 2 tablets (40 mg total) by mouth daily. Or as directed   Quantity: 10 tablet  Refills: 1     HYDROcodone-acetaminophen 5-325 MG Tabs  Commonly known as: Norco      Take 1 tablet by mouth every 6 (six) hours as needed.   Quantity: 30 tablet  Refills: 0     losartan 25 MG Tabs  Commonly known as: Cozaar      Take 1 tablet (25 mg total) by mouth daily.   Quantity: 30 tablet  Refills: 2     metoprolol succinate ER 25 MG Tb24  Commonly known as: Toprol XL      Take 1 tablet (25 mg total) by mouth daily.   Quantity: 30 tablet  Refills: 2     rosuvastatin 20 MG Tabs  Commonly known as: Crestor      Take 1 tablet (20 mg total) by mouth nightly.   Quantity: 30 tablet  Refills: 2            CONTINUE taking these medications        Instructions Prescription details   FISH OIL OR      Take 1 capsule by mouth daily.   Refills: 0     Multi-Vitamin/Minerals Tabs      Take 1 tablet by mouth daily.   Refills: 0     vitamin C 1000 MG Tabs      Take 1 tablet (1,000 mg total) by mouth daily.   Refills: 0     ZINC OR      Take 1 tablet by mouth daily.   Refills: 0            STOP taking these medications      amLODIPine 5 MG Tabs  Commonly known as: Norvasc        atorvastatin 10 MG Tabs  Commonly known as: Lipitor                  Where to Get Your Medications        These medications were sent to [id number] - [place], [place] - [address] [place] [address] AT [facility] [address], [phone], [phone] [address] [facility] [address]      Phone: [phone]    amiodarone 200 MG Tabs  aspirin 81 MG Chew  clopidogrel 75 MG Tabs  furosemide 20 MG Tabs  HYDROcodone-acetaminophen 5-325 MG Tabs  losartan 25 MG Tabs  metoprolol succinate ER 25 MG Tb24  rosuvastatin 20 MG Tabs         [place] reviewed[place] n/a     Follow-up appointment:   Sanchez Juarez MD  [address] [facility] [address]  [facility] [address]  [phone]    Follow up on 2025  @ 2pm w/Dr. Juarez, cardiology follow up    [facility]  [address] [facility] [address]  Follow up on 2025  For a chest xray, central scheduling will call you to arrange the appointment    Appointments for Next 30 Days 2025 - 2025        Date Arrival Time Visit Type Length Department Provider     2025 10:45 AM  POST OP [[id number]] 15 min [facility], Kary Bonilla PA    Patient Instructions:                           Vital signs:  Temp:  [98.1 °F (36.7 °C)-99.4 °F (37.4 °C)] 98.4 °F (36.9 °C)  Pulse:  [60-74] 74  Resp:  [18-28] 18  BP: (115-125)/(55-76) 120/71  SpO2:  [94 %-100 %] 97 %    Physical Exam:    General: No acute distress   Lungs: clear to auscultation  Cardiovascular: S1, S2  Abdomen: Soft      -----------------------------------------------------------------------------------------------  PATIENT DISCHARGE INSTRUCTIONS: See electronic chart    Shira Mack MD    Total time spent on discharge plannin minutes     The  Cures Act makes medical notes like these available to patients in the interest of transparency. Please be advised this is a medical document. Medical documents are intended to carry relevant information, facts as evident, and the clinical opinion of the practitioner. The medical note is intended as peer to peer communication and may appear blunt or direct. It is written in medical language and may contain abbreviations or verbiage that are unfamiliar.

## 2025-01-17 NOTE — PLAN OF CARE
Received patient at 0730. Alert and oriented x4. Tele rhythm NSR. O2 saturation 95%. Breath sounds clear. Bed is locked and in low position. Call light and personal belongings in reach. No c/o chest pain or shortness of breath. Pt voiding with no issue. Pt ambulated in hallway with no issues. Sternal incision clean, dry, approximated. Hopeful dc today. Care plan reviewed, pt verbalizes understanding.       Problem: Patient/Family Goals  Goal: Patient/Family Long Term Goal  Description: Patient's Long Term Goal: Have successful CABG and recovery  Interventions:  - ambulate as directed  - use incentive spirometer 10x per hour  - See additional Care Plan goals for specific interventions  Outcome: Progressing  Goal: Patient/Family Short Term Goal  Description: Patient's Short Term Goal: go home  Interventions:   - meds, labs, tele monitoring  - pain control  - See additional Care Plan goals for specific interventions  Outcome: Progressing     Problem: CARDIOVASCULAR - ADULT  Goal: Maintains optimal cardiac output and hemodynamic stability  Description: INTERVENTIONS:  - Monitor vital signs, rhythm, and trends  - Monitor for bleeding, hypotension and signs of decreased cardiac output  - Evaluate effectiveness of vasoactive medications to optimize hemodynamic stability  - Monitor arterial and/or venous puncture sites for bleeding and/or hematoma  - Assess quality of pulses, skin color and temperature  - Assess for signs of decreased coronary artery perfusion - ex. Angina  - Evaluate fluid balance, assess for edema, trend weights  Outcome: Progressing  Goal: Absence of cardiac arrhythmias or at baseline  Description: INTERVENTIONS:  - Continuous cardiac monitoring, monitor vital signs, obtain 12 lead EKG if indicated  - Evaluate effectiveness of antiarrhythmic and heart rate control medications as ordered  - Initiate emergency measures for life threatening arrhythmias  - Monitor electrolytes and administer replacement  therapy as ordered  Outcome: Progressing     Problem: METABOLIC/FLUID AND ELECTROLYTES - ADULT  Goal: Electrolytes maintained within normal limits  Description: INTERVENTIONS:  - Monitor labs and rhythm and assess patient for signs and symptoms of electrolyte imbalances  - Administer electrolyte replacement as ordered  - Monitor response to electrolyte replacements, including rhythm and repeat lab results as appropriate  - Fluid restriction as ordered  - Instruct patient on fluid and nutrition restrictions as appropriate  Outcome: Progressing  Goal: Hemodynamic stability and optimal renal function maintained  Description: INTERVENTIONS:  - Monitor labs and assess for signs and symptoms of volume excess or deficit  - Monitor intake, output and patient weight  - Monitor urine specific gravity, serum osmolarity and serum sodium as indicated or ordered  - Monitor response to interventions for patient's volume status, including labs, urine output, blood pressure (other measures as available)  - Encourage oral intake as appropriate  - Instruct patient on fluid and nutrition restrictions as appropriate  Outcome: Progressing     Problem: HEMATOLOGIC - ADULT  Goal: Maintains hematologic stability  Description: INTERVENTIONS  - Assess for signs and symptoms of bleeding or hemorrhage  - Monitor labs and vital signs for trends  - Administer supportive blood products/factors, fluids and medications as ordered and appropriate  - Administer supportive blood products/factors as ordered and appropriate  Outcome: Progressing     Problem: RESPIRATORY - ADULT  Goal: Achieves optimal ventilation and oxygenation  Description: INTERVENTIONS:  - Assess for changes in respiratory status  - Assess for changes in mentation and behavior  - Position to facilitate oxygenation and minimize respiratory effort  - Oxygen supplementation based on oxygen saturation or ABGs  - Provide Smoking Cessation handout, if applicable  - Encourage  broncho-pulmonary hygiene including cough, deep breathe, Incentive Spirometry  - Assess the need for suctioning and perform as needed  - Assess and instruct to report SOB or any respiratory difficulty  - Respiratory Therapy support as indicated  - Manage/alleviate anxiety  - Monitor for signs/symptoms of CO2 retention  Outcome: Progressing

## 2025-01-17 NOTE — PROGRESS NOTES
Assumed care of patient this evening.   Pt. Is alert and oriented times 4. Continent of bowel and bladder. Pt. Reporting 3/10 pain along incision and where pacerwires were pulled. PRN tylenol given.   Pt. Is NSR on tele. On RA. O2 in the high 90's.   Pt. Has ambulated 4 times in [place] today

## 2025-01-17 NOTE — CM/SW NOTE
01/16/25 0800   Discharge disposition   Expected discharge disposition Home-Health   Post Acute Care Provider [facility]   Discharge transportation Private car     SW spoke with Suri at [facility] to inform of discharge today. AVS sent to [facility]  to notify of discharge today.     [address] [address]  Phone: [phone]  Fax: [phone]    Renée GOODMAN MSW, LSW  Discharge Planner

## 2025-01-20 LAB
BASE EXCESS BLDA CALC-SCNC: 0.9 MMOL/L (ref ?–2)
BODY TEMPERATURE: 98.6 F
CA-I BLD-SCNC: 1.32 MMOL/L (ref 0.95–1.32)
COHGB MFR BLD: 1.2 % SAT (ref 0–3)
FIO2: 40 %
HCO3 BLDA-SCNC: 25.7 MEQ/L (ref 21–27)
HGB BLD-MCNC: 11.3 G/DL
LACTATE BLD-SCNC: 2.6 MMOL/L (ref 0.5–2)
METHGB MFR BLD: 0 % SAT (ref 0.4–1.5)
OXYHGB MFR BLDA: 98.5 % (ref 92–100)
PCO2 BLDA: 38 MM HG (ref 35–45)
PEEP: 5 CM H2O
PH BLDA: 7.43 [PH] (ref 7.35–7.45)
PO2 BLDA: 143 MM HG (ref 80–100)
POTASSIUM BLD-SCNC: 4.1 MMOL/L (ref 3.6–5.1)
PRESSURE SUPPORT: 5 CM H2O
SODIUM BLD-SCNC: 142 MMOL/L (ref 135–145)

## 2025-01-22 ENCOUNTER — TELEPHONE (OUTPATIENT)
Dept: CARDIOLOGY UNIT | Facility: HOSPITAL | Age: 45
End: 2025-01-22

## 2025-01-22 NOTE — PROGRESS NOTES
Follow Up Phone Call    1. How are you doing now that you are home? No Complaints    2. Have there been any changes in your wound/incision since going home? No    3. Is your pain manageable at home? Yes,     4. Are you following the walking routine given to you in the hospital? Yes, He says that he has a walking schedule    5. Are you continuing to use your incentive spirometer? Yes, I am getting it up to 1500    6. Do you have your appointments for Chest Xray?  Yes, 1/31/2025                                                              Primary MD?  No                                                              Cardiologist?  Yes, 1/31/2025 2 PM                                                              Dr. Juares? Yes, 2/11/2025 10:45 AM                            Cardiac Rehab?  No, He says that he will have to find a place that his insurance will cover it (not covered at [facility])    7. Do you have any other questions or concerns today? No        Kimmy RAMOS RN  1/22/2025  3:51 PM

## 2025-01-29 PROBLEM — E78.00 PURE HYPERCHOLESTEROLEMIA: Chronic | Status: ACTIVE | Noted: 2025-01-10

## 2025-01-29 PROBLEM — Z12.11 COLON CANCER SCREENING: Status: ACTIVE | Noted: 2025-01-29

## 2025-01-29 PROBLEM — Z83.719 FAMILY HISTORY OF COLONIC POLYPS: Status: ACTIVE | Noted: 2025-01-29

## 2025-01-29 PROBLEM — I21.4 NSTEMI (NON-ST ELEVATED MYOCARDIAL INFARCTION)  (CMD): Status: ACTIVE | Noted: 2025-01-09

## 2025-01-29 PROBLEM — I10 PRIMARY HYPERTENSION: Chronic | Status: ACTIVE | Noted: 2025-01-10

## 2025-01-29 NOTE — PROGRESS NOTES
Provider Clarification     Additional information about the patient's documented myocardial infarction.    NSTEMI    This note is part of the patient's medical record.

## 2025-01-31 ENCOUNTER — HOSPITAL ENCOUNTER (OUTPATIENT)
Dept: GENERAL RADIOLOGY | Facility: HOSPITAL | Age: 45
Discharge: HOME | End: 2025-01-31
Attending: THORACIC SURGERY (CARDIOTHORACIC VASCULAR SURGERY)
Payer: COMMERCIAL

## 2025-01-31 DIAGNOSIS — J90 PLEURAL EFFUSION: ICD-10-CM

## 2025-01-31 PROCEDURE — 71048 X-RAY EXAM CHEST 4+ VIEWS: CPT | Performed by: THORACIC SURGERY (CARDIOTHORACIC VASCULAR SURGERY)

## 2025-02-03 ENCOUNTER — TELEPHONE (OUTPATIENT)
Dept: INTERVENTIONAL RADIOLOGY/VASCULAR | Facility: HOSPITAL | Age: 45
End: 2025-02-03

## 2025-02-03 NOTE — TELEPHONE ENCOUNTER
Spoke with patient regarding CXR results. He has only tiny bilateral pleural effusions. He states his breathing is progressing well since leaving the hospital. He has been increasing activity, ambulating and using IS without issue. Advised he continue to rehab as he's been doing and f/u with us on 2/11 as scheduled, call sooner PRN

## 2025-02-08 PROBLEM — Z95.1 S/P CABG (CORONARY ARTERY BYPASS GRAFT): Status: ACTIVE | Noted: 2025-02-08

## 2025-02-12 ENCOUNTER — ORDER TRANSCRIPTION (OUTPATIENT)
Dept: CARDIAC REHAB | Facility: HOSPITAL | Age: 45
End: 2025-02-12

## 2025-02-12 DIAGNOSIS — Z95.1 S/P CABG (CORONARY ARTERY BYPASS GRAFT): Primary | ICD-10-CM

## 2025-02-17 DIAGNOSIS — Z95.1 S/P CABG (CORONARY ARTERY BYPASS GRAFT): Primary | ICD-10-CM

## 2025-03-03 DIAGNOSIS — Z12.5 PROSTATE CANCER SCREENING: ICD-10-CM

## 2025-03-03 DIAGNOSIS — Z00.00 ROUTINE GENERAL MEDICAL EXAMINATION AT A HEALTH CARE FACILITY: Primary | ICD-10-CM

## 2025-03-03 DIAGNOSIS — Z12.11 COLON CANCER SCREENING: ICD-10-CM

## 2025-03-04 ENCOUNTER — CARDPULM VISIT (OUTPATIENT)
Age: 45
End: 2025-03-04
Attending: INTERNAL MEDICINE
Payer: COMMERCIAL

## 2025-03-04 ENCOUNTER — LAB ENCOUNTER (OUTPATIENT)
Dept: LAB | Facility: HOSPITAL | Age: 45
End: 2025-03-04
Attending: INTERNAL MEDICINE
Payer: COMMERCIAL

## 2025-03-04 DIAGNOSIS — Z12.11 COLON CANCER SCREENING: ICD-10-CM

## 2025-03-04 DIAGNOSIS — Z12.5 PROSTATE CANCER SCREENING: ICD-10-CM

## 2025-03-04 DIAGNOSIS — Z00.00 ROUTINE GENERAL MEDICAL EXAMINATION AT A HEALTH CARE FACILITY: ICD-10-CM

## 2025-03-04 LAB
ALBUMIN SERPL-MCNC: 4.8 G/DL (ref 3.2–4.8)
ALBUMIN/GLOB SERPL: 1.8 {RATIO} (ref 1–2)
ALP LIVER SERPL-CCNC: 57 U/L
ALT SERPL-CCNC: 21 U/L
ANION GAP SERPL CALC-SCNC: 7 MMOL/L (ref 0–18)
AST SERPL-CCNC: 15 U/L (ref ?–34)
BASOPHILS # BLD AUTO: 0.08 X10(3) UL (ref 0–0.2)
BASOPHILS NFR BLD AUTO: 1.4 %
BILIRUB SERPL-MCNC: 0.5 MG/DL (ref 0.3–1.2)
BILIRUB UR QL STRIP.AUTO: NEGATIVE
BUN BLD-MCNC: 14 MG/DL (ref 9–23)
CALCIUM BLD-MCNC: 9.6 MG/DL (ref 8.7–10.6)
CHLORIDE SERPL-SCNC: 107 MMOL/L (ref 98–112)
CHOLEST SERPL-MCNC: 128 MG/DL (ref ?–200)
CLARITY UR REFRACT.AUTO: CLEAR
CO2 SERPL-SCNC: 30 MMOL/L (ref 21–32)
COLOR UR AUTO: YELLOW
COMPLEXED PSA SERPL-MCNC: 0.47 NG/ML (ref ?–4)
CREAT BLD-MCNC: 1.05 MG/DL
EGFRCR SERPLBLD CKD-EPI 2021: 89 ML/MIN/1.73M2 (ref 60–?)
EOSINOPHIL # BLD AUTO: 0.09 X10(3) UL (ref 0–0.7)
EOSINOPHIL NFR BLD AUTO: 1.6 %
ERYTHROCYTE [DISTWIDTH] IN BLOOD BY AUTOMATED COUNT: 13.2 %
EST. AVERAGE GLUCOSE BLD GHB EST-MCNC: 120 MG/DL (ref 68–126)
FASTING PATIENT LIPID ANSWER: YES
FASTING STATUS PATIENT QL REPORTED: YES
GLOBULIN PLAS-MCNC: 2.6 G/DL (ref 2–3.5)
GLUCOSE BLD-MCNC: 88 MG/DL (ref 70–99)
GLUCOSE UR STRIP.AUTO-MCNC: NORMAL MG/DL
HBA1C MFR BLD: 5.8 % (ref ?–5.7)
HCT VFR BLD AUTO: 41.1 %
HDLC SERPL-MCNC: 34 MG/DL (ref 40–59)
HGB BLD-MCNC: 13.5 G/DL
IMM GRANULOCYTES # BLD AUTO: 0.01 X10(3) UL (ref 0–1)
IMM GRANULOCYTES NFR BLD: 0.2 %
KETONES UR STRIP.AUTO-MCNC: NEGATIVE MG/DL
LDLC SERPL CALC-MCNC: 77 MG/DL (ref ?–100)
LEUKOCYTE ESTERASE UR QL STRIP.AUTO: NEGATIVE
LYMPHOCYTES # BLD AUTO: 1.42 X10(3) UL (ref 1–4)
LYMPHOCYTES NFR BLD AUTO: 25.4 %
MCH RBC QN AUTO: 28.1 PG (ref 26–34)
MCHC RBC AUTO-ENTMCNC: 32.8 G/DL (ref 31–37)
MCV RBC AUTO: 85.4 FL
MONOCYTES # BLD AUTO: 0.45 X10(3) UL (ref 0.1–1)
MONOCYTES NFR BLD AUTO: 8.1 %
NEUTROPHILS # BLD AUTO: 3.54 X10 (3) UL (ref 1.5–7.7)
NEUTROPHILS # BLD AUTO: 3.54 X10(3) UL (ref 1.5–7.7)
NEUTROPHILS NFR BLD AUTO: 63.3 %
NITRITE UR QL STRIP.AUTO: NEGATIVE
NONHDLC SERPL-MCNC: 94 MG/DL (ref ?–130)
OSMOLALITY SERPL CALC.SUM OF ELEC: 298 MOSM/KG (ref 275–295)
PH UR STRIP.AUTO: 7.5 [PH] (ref 5–8)
PLATELET # BLD AUTO: 233 10(3)UL (ref 150–450)
POTASSIUM SERPL-SCNC: 4.2 MMOL/L (ref 3.5–5.1)
PROT SERPL-MCNC: 7.4 G/DL (ref 5.7–8.2)
PROT UR STRIP.AUTO-MCNC: NEGATIVE MG/DL
RBC # BLD AUTO: 4.81 X10(6)UL
RBC UR QL AUTO: NEGATIVE
SODIUM SERPL-SCNC: 144 MMOL/L (ref 136–145)
SP GR UR STRIP.AUTO: 1.02 (ref 1–1.03)
TRIGL SERPL-MCNC: 86 MG/DL (ref 30–149)
TSI SER-ACNC: 2.88 UIU/ML (ref 0.55–4.78)
UROBILINOGEN UR STRIP.AUTO-MCNC: NORMAL MG/DL
VLDLC SERPL CALC-MCNC: 13 MG/DL (ref 0–30)
WBC # BLD AUTO: 5.6 X10(3) UL (ref 4–11)

## 2025-03-04 PROCEDURE — 83036 HEMOGLOBIN GLYCOSYLATED A1C: CPT

## 2025-03-04 PROCEDURE — 81003 URINALYSIS AUTO W/O SCOPE: CPT

## 2025-03-04 PROCEDURE — 84443 ASSAY THYROID STIM HORMONE: CPT

## 2025-03-04 PROCEDURE — 80053 COMPREHEN METABOLIC PANEL: CPT

## 2025-03-04 PROCEDURE — 80061 LIPID PANEL: CPT

## 2025-03-04 PROCEDURE — 36415 COLL VENOUS BLD VENIPUNCTURE: CPT

## 2025-03-04 PROCEDURE — 85025 COMPLETE CBC W/AUTO DIFF WBC: CPT

## 2025-03-05 ENCOUNTER — APPOINTMENT (OUTPATIENT)
Dept: CARDIAC REHAB | Age: 45
End: 2025-03-05

## 2025-03-06 ENCOUNTER — CARDPULM VISIT (OUTPATIENT)
Age: 45
End: 2025-03-06
Attending: INTERNAL MEDICINE
Payer: COMMERCIAL

## 2025-03-06 PROCEDURE — 93798 PHYS/QHP OP CAR RHAB W/ECG: CPT

## 2025-03-10 ENCOUNTER — APPOINTMENT (OUTPATIENT)
Age: 45
End: 2025-03-10
Attending: INTERNAL MEDICINE
Payer: COMMERCIAL

## 2025-03-10 PROCEDURE — 93798 PHYS/QHP OP CAR RHAB W/ECG: CPT

## 2025-03-11 ENCOUNTER — APPOINTMENT (OUTPATIENT)
Age: 45
End: 2025-03-11
Attending: INTERNAL MEDICINE
Payer: COMMERCIAL

## 2025-03-12 ENCOUNTER — CARDPULM VISIT (OUTPATIENT)
Age: 45
End: 2025-03-12
Attending: INTERNAL MEDICINE
Payer: COMMERCIAL

## 2025-03-12 PROCEDURE — 93798 PHYS/QHP OP CAR RHAB W/ECG: CPT

## 2025-03-13 ENCOUNTER — CARDPULM VISIT (OUTPATIENT)
Age: 45
End: 2025-03-13
Attending: INTERNAL MEDICINE
Payer: COMMERCIAL

## 2025-03-13 ENCOUNTER — APPOINTMENT (OUTPATIENT)
Age: 45
End: 2025-03-13
Attending: INTERNAL MEDICINE
Payer: COMMERCIAL

## 2025-03-13 PROCEDURE — 93798 PHYS/QHP OP CAR RHAB W/ECG: CPT

## 2025-03-16 ENCOUNTER — APPOINTMENT (OUTPATIENT)
Dept: LAB | Age: 45
End: 2025-03-16
Attending: INTERNAL MEDICINE
Payer: COMMERCIAL

## 2025-03-16 ENCOUNTER — LAB ENCOUNTER (OUTPATIENT)
Dept: LAB | Facility: HOSPITAL | Age: 45
End: 2025-03-16
Attending: INTERNAL MEDICINE
Payer: COMMERCIAL

## 2025-03-16 PROCEDURE — 82274 ASSAY TEST FOR BLOOD FECAL: CPT

## 2025-03-17 ENCOUNTER — CARDPULM VISIT (OUTPATIENT)
Age: 45
End: 2025-03-17
Attending: INTERNAL MEDICINE
Payer: COMMERCIAL

## 2025-03-17 PROCEDURE — 93798 PHYS/QHP OP CAR RHAB W/ECG: CPT

## 2025-03-18 ENCOUNTER — APPOINTMENT (OUTPATIENT)
Age: 45
End: 2025-03-18
Attending: INTERNAL MEDICINE
Payer: COMMERCIAL

## 2025-03-18 ENCOUNTER — APPOINTMENT (OUTPATIENT)
Age: 45
End: 2025-03-18
Attending: INTERNAL MEDICINE

## 2025-03-18 LAB — HEMOCCULT STL QL: NEGATIVE

## 2025-03-20 ENCOUNTER — APPOINTMENT (OUTPATIENT)
Age: 45
End: 2025-03-20
Attending: INTERNAL MEDICINE
Payer: COMMERCIAL

## 2025-03-20 ENCOUNTER — CARDPULM VISIT (OUTPATIENT)
Age: 45
End: 2025-03-20
Attending: INTERNAL MEDICINE
Payer: COMMERCIAL

## 2025-03-20 PROCEDURE — 93798 PHYS/QHP OP CAR RHAB W/ECG: CPT

## 2025-03-24 ENCOUNTER — CARDPULM VISIT (OUTPATIENT)
Age: 45
End: 2025-03-24
Attending: INTERNAL MEDICINE
Payer: COMMERCIAL

## 2025-03-24 PROCEDURE — 93798 PHYS/QHP OP CAR RHAB W/ECG: CPT

## 2025-03-25 ENCOUNTER — APPOINTMENT (OUTPATIENT)
Age: 45
End: 2025-03-25
Attending: INTERNAL MEDICINE
Payer: COMMERCIAL

## 2025-03-26 ENCOUNTER — CARDPULM VISIT (OUTPATIENT)
Age: 45
End: 2025-03-26
Attending: INTERNAL MEDICINE
Payer: COMMERCIAL

## 2025-03-26 PROCEDURE — 93798 PHYS/QHP OP CAR RHAB W/ECG: CPT

## 2025-03-27 ENCOUNTER — APPOINTMENT (OUTPATIENT)
Age: 45
End: 2025-03-27
Attending: INTERNAL MEDICINE
Payer: COMMERCIAL

## 2025-03-31 ENCOUNTER — APPOINTMENT (OUTPATIENT)
Age: 45
End: 2025-03-31
Attending: INTERNAL MEDICINE
Payer: COMMERCIAL

## 2025-03-31 PROCEDURE — 93798 PHYS/QHP OP CAR RHAB W/ECG: CPT

## 2025-04-02 ENCOUNTER — CARDPULM VISIT (OUTPATIENT)
Age: 45
End: 2025-04-02
Attending: INTERNAL MEDICINE
Payer: COMMERCIAL

## 2025-04-02 PROCEDURE — 93798 PHYS/QHP OP CAR RHAB W/ECG: CPT

## 2025-04-03 ENCOUNTER — APPOINTMENT (OUTPATIENT)
Age: 45
End: 2025-04-03
Attending: INTERNAL MEDICINE

## 2025-04-03 ENCOUNTER — CARDPULM VISIT (OUTPATIENT)
Age: 45
End: 2025-04-03
Attending: INTERNAL MEDICINE
Payer: COMMERCIAL

## 2025-04-03 PROCEDURE — 93798 PHYS/QHP OP CAR RHAB W/ECG: CPT

## 2025-04-07 ENCOUNTER — CARDPULM VISIT (OUTPATIENT)
Age: 45
End: 2025-04-07
Attending: INTERNAL MEDICINE
Payer: COMMERCIAL

## 2025-04-07 PROCEDURE — 93798 PHYS/QHP OP CAR RHAB W/ECG: CPT

## 2025-04-09 ENCOUNTER — APPOINTMENT (OUTPATIENT)
Age: 45
End: 2025-04-09
Attending: INTERNAL MEDICINE
Payer: COMMERCIAL

## 2025-04-10 ENCOUNTER — CARDPULM VISIT (OUTPATIENT)
Age: 45
End: 2025-04-10
Attending: INTERNAL MEDICINE
Payer: COMMERCIAL

## 2025-04-10 PROCEDURE — 93798 PHYS/QHP OP CAR RHAB W/ECG: CPT

## 2025-04-14 ENCOUNTER — CARDPULM VISIT (OUTPATIENT)
Age: 45
End: 2025-04-14
Attending: INTERNAL MEDICINE
Payer: COMMERCIAL

## 2025-04-14 PROCEDURE — 93798 PHYS/QHP OP CAR RHAB W/ECG: CPT

## 2025-04-16 ENCOUNTER — CARDPULM VISIT (OUTPATIENT)
Age: 45
End: 2025-04-16
Attending: INTERNAL MEDICINE
Payer: COMMERCIAL

## 2025-04-16 PROCEDURE — 93798 PHYS/QHP OP CAR RHAB W/ECG: CPT

## 2025-04-17 ENCOUNTER — APPOINTMENT (OUTPATIENT)
Age: 45
End: 2025-04-17
Attending: INTERNAL MEDICINE
Payer: COMMERCIAL

## 2025-04-21 ENCOUNTER — CARDPULM VISIT (OUTPATIENT)
Age: 45
End: 2025-04-21
Attending: INTERNAL MEDICINE
Payer: COMMERCIAL

## 2025-04-21 PROCEDURE — 93798 PHYS/QHP OP CAR RHAB W/ECG: CPT

## 2025-04-23 ENCOUNTER — CARDPULM VISIT (OUTPATIENT)
Age: 45
End: 2025-04-23
Attending: INTERNAL MEDICINE
Payer: COMMERCIAL

## 2025-04-23 PROCEDURE — 93798 PHYS/QHP OP CAR RHAB W/ECG: CPT

## 2025-04-24 ENCOUNTER — CARDPULM VISIT (OUTPATIENT)
Age: 45
End: 2025-04-24
Attending: INTERNAL MEDICINE
Payer: COMMERCIAL

## 2025-04-24 PROCEDURE — 93798 PHYS/QHP OP CAR RHAB W/ECG: CPT

## 2025-04-28 ENCOUNTER — CARDPULM VISIT (OUTPATIENT)
Age: 45
End: 2025-04-28
Attending: INTERNAL MEDICINE
Payer: COMMERCIAL

## 2025-04-28 PROCEDURE — 93798 PHYS/QHP OP CAR RHAB W/ECG: CPT

## 2025-04-30 ENCOUNTER — CARDPULM VISIT (OUTPATIENT)
Age: 45
End: 2025-04-30
Attending: INTERNAL MEDICINE
Payer: COMMERCIAL

## 2025-04-30 PROCEDURE — 93798 PHYS/QHP OP CAR RHAB W/ECG: CPT

## 2025-05-01 ENCOUNTER — APPOINTMENT (OUTPATIENT)
Age: 45
End: 2025-05-01
Attending: INTERNAL MEDICINE
Payer: COMMERCIAL

## 2025-05-05 ENCOUNTER — CARDPULM VISIT (OUTPATIENT)
Age: 45
End: 2025-05-05
Attending: INTERNAL MEDICINE
Payer: COMMERCIAL

## 2025-05-05 PROCEDURE — 93798 PHYS/QHP OP CAR RHAB W/ECG: CPT

## 2025-05-07 ENCOUNTER — APPOINTMENT (OUTPATIENT)
Age: 45
End: 2025-05-07
Attending: INTERNAL MEDICINE
Payer: COMMERCIAL

## 2025-05-08 ENCOUNTER — APPOINTMENT (OUTPATIENT)
Age: 45
End: 2025-05-08
Attending: INTERNAL MEDICINE
Payer: COMMERCIAL

## 2025-05-12 ENCOUNTER — CARDPULM VISIT (OUTPATIENT)
Age: 45
End: 2025-05-12
Attending: INTERNAL MEDICINE
Payer: COMMERCIAL

## 2025-05-12 PROCEDURE — 93798 PHYS/QHP OP CAR RHAB W/ECG: CPT

## 2025-05-14 ENCOUNTER — CARDPULM VISIT (OUTPATIENT)
Age: 45
End: 2025-05-14
Attending: INTERNAL MEDICINE
Payer: COMMERCIAL

## 2025-05-14 PROCEDURE — 93798 PHYS/QHP OP CAR RHAB W/ECG: CPT

## 2025-05-15 ENCOUNTER — APPOINTMENT (OUTPATIENT)
Age: 45
End: 2025-05-15
Attending: INTERNAL MEDICINE
Payer: COMMERCIAL

## 2025-05-19 ENCOUNTER — CARDPULM VISIT (OUTPATIENT)
Age: 45
End: 2025-05-19
Attending: INTERNAL MEDICINE
Payer: COMMERCIAL

## 2025-05-19 PROCEDURE — 93798 PHYS/QHP OP CAR RHAB W/ECG: CPT

## 2025-05-21 ENCOUNTER — APPOINTMENT (OUTPATIENT)
Age: 45
End: 2025-05-21
Attending: INTERNAL MEDICINE
Payer: COMMERCIAL

## 2025-05-22 ENCOUNTER — CARDPULM VISIT (OUTPATIENT)
Age: 45
End: 2025-05-22
Attending: INTERNAL MEDICINE
Payer: COMMERCIAL

## 2025-05-22 PROCEDURE — 93798 PHYS/QHP OP CAR RHAB W/ECG: CPT

## 2025-05-26 ENCOUNTER — APPOINTMENT (OUTPATIENT)
Age: 45
End: 2025-05-26
Attending: INTERNAL MEDICINE
Payer: COMMERCIAL

## 2025-05-28 ENCOUNTER — CARDPULM VISIT (OUTPATIENT)
Age: 45
End: 2025-05-28
Attending: INTERNAL MEDICINE
Payer: COMMERCIAL

## 2025-05-28 PROCEDURE — 93798 PHYS/QHP OP CAR RHAB W/ECG: CPT

## 2025-05-29 ENCOUNTER — CARDPULM VISIT (OUTPATIENT)
Age: 45
End: 2025-05-29
Attending: INTERNAL MEDICINE
Payer: COMMERCIAL

## 2025-05-29 PROCEDURE — 93798 PHYS/QHP OP CAR RHAB W/ECG: CPT

## 2025-06-02 ENCOUNTER — CARDPULM VISIT (OUTPATIENT)
Age: 45
End: 2025-06-02
Attending: INTERNAL MEDICINE
Payer: COMMERCIAL

## 2025-06-02 PROCEDURE — 93798 PHYS/QHP OP CAR RHAB W/ECG: CPT

## 2025-06-03 ENCOUNTER — LAB ENCOUNTER (OUTPATIENT)
Dept: LAB | Facility: HOSPITAL | Age: 45
End: 2025-06-03
Attending: INTERNAL MEDICINE
Payer: COMMERCIAL

## 2025-06-03 ENCOUNTER — CARDPULM VISIT (OUTPATIENT)
Age: 45
End: 2025-06-03
Attending: INTERNAL MEDICINE
Payer: COMMERCIAL

## 2025-06-03 DIAGNOSIS — E78.00 PURE HYPERCHOLESTEROLEMIA: Primary | ICD-10-CM

## 2025-06-03 LAB
CHOLEST SERPL-MCNC: 117 MG/DL (ref ?–200)
FASTING PATIENT LIPID ANSWER: YES
HDLC SERPL-MCNC: 37 MG/DL (ref 40–59)
LDLC SERPL CALC-MCNC: 63 MG/DL (ref ?–100)
NONHDLC SERPL-MCNC: 80 MG/DL (ref ?–130)
TRIGL SERPL-MCNC: 89 MG/DL (ref 30–149)
VLDLC SERPL CALC-MCNC: 13 MG/DL (ref 0–30)

## 2025-06-03 PROCEDURE — 36415 COLL VENOUS BLD VENIPUNCTURE: CPT

## 2025-06-03 PROCEDURE — 93798 PHYS/QHP OP CAR RHAB W/ECG: CPT

## 2025-06-03 PROCEDURE — 80061 LIPID PANEL: CPT

## 2025-06-04 ENCOUNTER — CARDPULM VISIT (OUTPATIENT)
Age: 45
End: 2025-06-04
Attending: INTERNAL MEDICINE
Payer: COMMERCIAL

## 2025-06-04 PROCEDURE — 93798 PHYS/QHP OP CAR RHAB W/ECG: CPT

## 2025-06-05 ENCOUNTER — CARDPULM VISIT (OUTPATIENT)
Age: 45
End: 2025-06-05
Attending: INTERNAL MEDICINE
Payer: COMMERCIAL

## 2025-06-05 PROCEDURE — 93798 PHYS/QHP OP CAR RHAB W/ECG: CPT

## 2025-06-09 ENCOUNTER — CARDPULM VISIT (OUTPATIENT)
Age: 45
End: 2025-06-09
Attending: INTERNAL MEDICINE
Payer: COMMERCIAL

## 2025-06-09 PROCEDURE — 93798 PHYS/QHP OP CAR RHAB W/ECG: CPT

## 2025-06-10 ENCOUNTER — CARDPULM VISIT (OUTPATIENT)
Age: 45
End: 2025-06-10
Attending: INTERNAL MEDICINE
Payer: COMMERCIAL

## 2025-06-10 PROCEDURE — 93798 PHYS/QHP OP CAR RHAB W/ECG: CPT

## 2025-06-11 ENCOUNTER — CARDPULM VISIT (OUTPATIENT)
Age: 45
End: 2025-06-11
Attending: INTERNAL MEDICINE
Payer: COMMERCIAL

## 2025-06-11 PROCEDURE — 93798 PHYS/QHP OP CAR RHAB W/ECG: CPT

## 2025-06-12 ENCOUNTER — CARDPULM VISIT (OUTPATIENT)
Age: 45
End: 2025-06-12
Attending: INTERNAL MEDICINE
Payer: COMMERCIAL

## 2025-06-12 PROCEDURE — 93798 PHYS/QHP OP CAR RHAB W/ECG: CPT

## 2025-06-16 ENCOUNTER — CARDPULM VISIT (OUTPATIENT)
Age: 45
End: 2025-06-16
Attending: INTERNAL MEDICINE
Payer: COMMERCIAL

## 2025-06-16 PROCEDURE — 93798 PHYS/QHP OP CAR RHAB W/ECG: CPT

## 2025-06-18 ENCOUNTER — APPOINTMENT (OUTPATIENT)
Age: 45
End: 2025-06-18
Attending: INTERNAL MEDICINE
Payer: COMMERCIAL

## 2025-06-19 ENCOUNTER — APPOINTMENT (OUTPATIENT)
Age: 45
End: 2025-06-19
Attending: INTERNAL MEDICINE
Payer: COMMERCIAL

## 2025-06-23 ENCOUNTER — APPOINTMENT (OUTPATIENT)
Age: 45
End: 2025-06-23
Attending: INTERNAL MEDICINE
Payer: COMMERCIAL

## 2025-06-25 ENCOUNTER — APPOINTMENT (OUTPATIENT)
Age: 45
End: 2025-06-25
Attending: INTERNAL MEDICINE
Payer: COMMERCIAL

## 2025-06-26 ENCOUNTER — APPOINTMENT (OUTPATIENT)
Age: 45
End: 2025-06-26
Attending: INTERNAL MEDICINE
Payer: COMMERCIAL

## (undated) DEVICE — BATTERY PACK FOR VARISPEED: Brand: STRYKER VARISPEED

## (undated) DEVICE — CELL SAVER RESERVOIR

## (undated) DEVICE — CABLE PT L10FT ELECTRD PIN DIA1-2MM VENTRICLE

## (undated) DEVICE — SYRINGE MED 30ML STD CLR PLAS LL TIP N CTRL

## (undated) DEVICE — SOLUTION IRRIG 1000ML 0.9% NACL USP BTL

## (undated) DEVICE — [HIGH FLOW INSUFFLATOR,  DO NOT USE IF PACKAGE IS DAMAGED,  KEEP DRY,  KEEP AWAY FROM SUNLIGHT,  PROTECT FROM HEAT AND RADIOACTIVE SOURCES.]: Brand: PNEUMOSURE

## (undated) DEVICE — OPEN HEART: Brand: MEDLINE INDUSTRIES, INC.

## (undated) DEVICE — GOWN,SIRUS,FABRIC-REINFORCED,X-LARGE: Brand: MEDLINE

## (undated) DEVICE — GLOVE SUR 7 BIOGEL PI ORTHOPRO PIP BRN PWD F

## (undated) DEVICE — CABLE SUR L12IN SM CLP LNG DISP

## (undated) DEVICE — SUT PROL 6-0 18IN C-1 NABSRB BLU 13MM 3/8 CIR

## (undated) DEVICE — SUT 6 DBL WIRE 14IN CCS-1 NABSRB L49MM 1/2 CI

## (undated) DEVICE — Device: Brand: INTELLICART™

## (undated) DEVICE — STERILE POLYISOPRENE POWDER-FREE SURGICAL GLOVES: Brand: PROTEXIS

## (undated) DEVICE — TRANSFER PACK CONTAINER 600 ML WITH COUPLER. STERILE FLUID PATH: Brand: FENWAL TRANSFER PACK CONTAINER 600 ML WITH COUPLER

## (undated) DEVICE — SUT PROL 8-0 18IN BV130-5 NABSRB BLU 6.5MM 3/

## (undated) DEVICE — CLIP INT L ORNG TI TRNSVRS GRV CHEVRON SHP

## (undated) DEVICE — Device: Brand: VIRTUOSAPH PLUS WITH RADIAL INDICATION

## (undated) DEVICE — LACTATED R 1000ML INJ

## (undated) DEVICE — SUT POLYDEK 2-0 75CM NABSRB GRN

## (undated) NOTE — LETTER
:  1980      To Whom It May Concern:    This patient was seen in our hospital on 2025 - 25 .  Work status:  Remain off work until re-evaluation at scheduled follow up with cardiothoracic surgery in 4 weeks    May return to work status  per above.    If this office may be of further assistance, please do not hesitate to contact us.      Sincerely,        Shira Mack MD

## (undated) NOTE — LETTER
:  1980      To Whom It May Concern:    This patient was seen in our hospital on 2025  for emergent caardiac condition requiring emergent open heart surgery.   The patient as of 2025 remains hospitalized .   His wife Tracie Castro has been at bedside in the cardiac ICU since and this letter serves to excuse her during this critical time 25-ongoing as of 2025      If this office may be of further assistance, please do not hesitate to contact us.      Sincerely,        Shira Mack MD

## (undated) NOTE — LETTER
[address]  [address]  Consent for Procedure/Sedation  Date: 1/9/2025         Time: 0900    I hereby authorize Sanchez Juarez, my physician and his/her assistants (if applicable), which may include medical students, residents, and/or fellows, to perform the following surgical operation/ procedure and administer such anesthesia as may be determined necessary by my physician:  Operation/Procedure name (s)  Cardiac Catheterization, Left Ventricular Cineangiography, Bilateral Selective Coronary Angiography and/or Right Heart Catheterization; possible Percutaneous Transluminal Coronary Angioplasty, Coronary Atherectomy, Coronary Stent, Intracoronary Thrombolytic therapy, Antiplatelet therapy and/or Intravascular Ultrasound on [place]   2.   I recognize that during the surgical operation/procedure, unforeseen conditions may necessitate additional or different procedures than those listed above.  I, therefore, further authorize and request that the above-named surgeon, assistants, or designees perform such procedures as are, in their judgment, necessary and desirable.    3.   My surgeon/physician has discussed prior to my surgery the potential benefits, risks and side effects of this procedure; the likelihood of achieving goals; and potential problems that might occur during recuperation.  They also discussed reasonable alternatives to the procedure, including risks, benefits, and side effects related to the alternatives and risks related to not receiving this procedure.  I have had all my questions answered and I acknowledge that no guarantee has been made as to the result that may be obtained.    4.   Should the need arise during my operation/procedure, which includes change of level of care prior to discharge, I also consent to the administration of blood and/or blood products.  Further, I understand that despite careful testing and screening of blood or blood products by  collecting agencies, I may still be subject to ill effects as a result of receiving a blood transfusion and/or blood products.  The following are some, but not all, of the potential risks that can occur: fever and allergic reactions, hemolytic reactions, transmission of diseases such as Hepatitis, AIDS and Cytomegalovirus (CMV) and fluid overload.  In the event that I wish to have an autologous transfusion of my own blood, or a directed donor transfusion, I will discuss this with my physician.  Check only if Refusing Blood or Blood Products  I understand refusal of blood or blood products as deemed necessary by my physician may have serious consequences to my condition to include possible death. I hereby assume responsibility for my refusal and release the hospital, its personnel, and my physicians from any responsibility for the consequences of my refusal.          o  Refuse      5.   I authorize the use of any specimen, organs, tissues, body parts or foreign objects that may be removed from my body during the operation/procedure for diagnosis, research or teaching purposes and their subsequent disposal by hospital authorities.  I also authorize the release of specimen test results and/or written reports to my treating physician on the hospital medical staff or other referring or consulting physicians involved in my care, at the discretion of the Pathologist or my treating physician.    6.   I consent to the photographing or videotaping of the operations or procedures to be performed, including appropriate portions of my body for medical, scientific, or educational purposes, provided my identity is not revealed by the pictures or by descriptive texts accompanying them.  If the procedure has been photographed/videotaped, the surgeon will obtain the original picture, image, videotape or CD.  The hospital will not be responsible for storage, release or maintenance of the picture, image, tape or CD.    7.   I consent  to the presence of a  or observers in the operating room as deemed necessary by my physician or their designees.    8.   I recognize that in the event my procedure results in extended X-Ray/fluoroscopy time, I may develop a skin reaction.    9. If I have a Do Not Attempt Resuscitation (DNAR) order in place, that status will be suspended while in the operating room, procedural suite, and during the recovery period unless otherwise explicitly stated by me (or a person authorized to consent on my behalf). The surgeon or my attending physician will determine when the applicable recovery period ends for purposes of reinstating the DNAR order.  10. Patients having a sterilization procedure: I understand that if the procedure is successful the results will be permanent and it will therefore be impossible for me to inseminate, conceive, or bear children.  I also understand that the procedure is intended to result in sterility, although the result has not been guaranteed.   11. I acknowledge that my physician has explained sedation/analgesia administration to me including the risk and benefits I consent to the administration of sedation/analgesia as may be necessary or desirable in the judgment of my physician.    I CERTIFY THAT I HAVE READ AND FULLY UNDERSTAND THE ABOVE CONSENT TO OPERATION and/or OTHER PROCEDURE.      ____________________________________       _________________________________      ______________________________  Signature of Patient         Signature of Responsible Person        Printed Name of Responsible Person   ____________________________________      _________________________________      ______________________________       Signature of Witness          Relationship to Patient                       Date                                       Time  Patient Name: Wander Castro  : 1980    Reviewed: 2024   Printed: 2025  Medical Record #: [id number] Page 1 of 1